# Patient Record
Sex: FEMALE | Race: ASIAN | NOT HISPANIC OR LATINO | URBAN - METROPOLITAN AREA
[De-identification: names, ages, dates, MRNs, and addresses within clinical notes are randomized per-mention and may not be internally consistent; named-entity substitution may affect disease eponyms.]

---

## 2020-07-14 ENCOUNTER — INPATIENT (INPATIENT)
Facility: HOSPITAL | Age: 85
LOS: 2 days | Discharge: HOME | End: 2020-07-17
Attending: HOSPITALIST | Admitting: HOSPITALIST
Payer: MEDICAID

## 2020-07-14 VITALS
TEMPERATURE: 98 F | SYSTOLIC BLOOD PRESSURE: 113 MMHG | DIASTOLIC BLOOD PRESSURE: 64 MMHG | RESPIRATION RATE: 16 BRPM | HEART RATE: 69 BPM | OXYGEN SATURATION: 98 %

## 2020-07-14 DIAGNOSIS — R62.7 ADULT FAILURE TO THRIVE: ICD-10-CM

## 2020-07-14 DIAGNOSIS — R60.0 LOCALIZED EDEMA: ICD-10-CM

## 2020-07-14 DIAGNOSIS — R13.10 DYSPHAGIA, UNSPECIFIED: ICD-10-CM

## 2020-07-14 DIAGNOSIS — R16.0 HEPATOMEGALY, NOT ELSEWHERE CLASSIFIED: ICD-10-CM

## 2020-07-14 DIAGNOSIS — Z29.9 ENCOUNTER FOR PROPHYLACTIC MEASURES, UNSPECIFIED: ICD-10-CM

## 2020-07-14 DIAGNOSIS — R07.89 OTHER CHEST PAIN: ICD-10-CM

## 2020-07-14 LAB
ALBUMIN SERPL ELPH-MCNC: 3.1 G/DL — LOW (ref 3.3–5)
ALP SERPL-CCNC: 323 U/L — HIGH (ref 40–120)
ALT FLD-CCNC: 68 U/L — HIGH (ref 4–33)
AMMONIA BLD-MCNC: 43 UMOL/L — SIGNIFICANT CHANGE UP (ref 11–55)
ANION GAP SERPL CALC-SCNC: 11 MMO/L — SIGNIFICANT CHANGE UP (ref 7–14)
APPEARANCE UR: CLEAR — SIGNIFICANT CHANGE UP
AST SERPL-CCNC: 83 U/L — HIGH (ref 4–32)
BASOPHILS # BLD AUTO: 0.02 K/UL — SIGNIFICANT CHANGE UP (ref 0–0.2)
BASOPHILS NFR BLD AUTO: 0.5 % — SIGNIFICANT CHANGE UP (ref 0–2)
BILIRUB SERPL-MCNC: 0.6 MG/DL — SIGNIFICANT CHANGE UP (ref 0.2–1.2)
BILIRUB UR-MCNC: NEGATIVE — SIGNIFICANT CHANGE UP
BLOOD UR QL VISUAL: NEGATIVE — SIGNIFICANT CHANGE UP
BUN SERPL-MCNC: 19 MG/DL — SIGNIFICANT CHANGE UP (ref 7–23)
CALCIUM SERPL-MCNC: 8.2 MG/DL — LOW (ref 8.4–10.5)
CHLORIDE SERPL-SCNC: 104 MMOL/L — SIGNIFICANT CHANGE UP (ref 98–107)
CO2 SERPL-SCNC: 22 MMOL/L — SIGNIFICANT CHANGE UP (ref 22–31)
COLOR SPEC: SIGNIFICANT CHANGE UP
CREAT SERPL-MCNC: 0.77 MG/DL — SIGNIFICANT CHANGE UP (ref 0.5–1.3)
EOSINOPHIL # BLD AUTO: 0.03 K/UL — SIGNIFICANT CHANGE UP (ref 0–0.5)
EOSINOPHIL NFR BLD AUTO: 0.7 % — SIGNIFICANT CHANGE UP (ref 0–6)
GLUCOSE SERPL-MCNC: 84 MG/DL — SIGNIFICANT CHANGE UP (ref 70–99)
GLUCOSE UR-MCNC: NEGATIVE — SIGNIFICANT CHANGE UP
HCT VFR BLD CALC: 34.6 % — SIGNIFICANT CHANGE UP (ref 34.5–45)
HGB BLD-MCNC: 11 G/DL — LOW (ref 11.5–15.5)
IMM GRANULOCYTES NFR BLD AUTO: 0.7 % — SIGNIFICANT CHANGE UP (ref 0–1.5)
KETONES UR-MCNC: SIGNIFICANT CHANGE UP
LEUKOCYTE ESTERASE UR-ACNC: NEGATIVE — SIGNIFICANT CHANGE UP
LIDOCAIN IGE QN: 56.8 U/L — SIGNIFICANT CHANGE UP (ref 7–60)
LYMPHOCYTES # BLD AUTO: 0.98 K/UL — LOW (ref 1–3.3)
LYMPHOCYTES # BLD AUTO: 23.5 % — SIGNIFICANT CHANGE UP (ref 13–44)
MAGNESIUM SERPL-MCNC: 2.1 MG/DL — SIGNIFICANT CHANGE UP (ref 1.6–2.6)
MCHC RBC-ENTMCNC: 29.6 PG — SIGNIFICANT CHANGE UP (ref 27–34)
MCHC RBC-ENTMCNC: 31.8 % — LOW (ref 32–36)
MCV RBC AUTO: 93 FL — SIGNIFICANT CHANGE UP (ref 80–100)
MONOCYTES # BLD AUTO: 0.3 K/UL — SIGNIFICANT CHANGE UP (ref 0–0.9)
MONOCYTES NFR BLD AUTO: 7.2 % — SIGNIFICANT CHANGE UP (ref 2–14)
NEUTROPHILS # BLD AUTO: 2.81 K/UL — SIGNIFICANT CHANGE UP (ref 1.8–7.4)
NEUTROPHILS NFR BLD AUTO: 67.4 % — SIGNIFICANT CHANGE UP (ref 43–77)
NITRITE UR-MCNC: NEGATIVE — SIGNIFICANT CHANGE UP
NRBC # FLD: 0 K/UL — SIGNIFICANT CHANGE UP (ref 0–0)
NT-PROBNP SERPL-SCNC: 434.9 PG/ML — SIGNIFICANT CHANGE UP
PH UR: 7 — SIGNIFICANT CHANGE UP (ref 5–8)
PHOSPHATE SERPL-MCNC: 2.9 MG/DL — SIGNIFICANT CHANGE UP (ref 2.5–4.5)
PLATELET # BLD AUTO: 226 K/UL — SIGNIFICANT CHANGE UP (ref 150–400)
PMV BLD: 9.6 FL — SIGNIFICANT CHANGE UP (ref 7–13)
POTASSIUM SERPL-MCNC: 3.9 MMOL/L — SIGNIFICANT CHANGE UP (ref 3.5–5.3)
POTASSIUM SERPL-SCNC: 3.9 MMOL/L — SIGNIFICANT CHANGE UP (ref 3.5–5.3)
PROT SERPL-MCNC: 5.6 G/DL — LOW (ref 6–8.3)
PROT UR-MCNC: NEGATIVE — SIGNIFICANT CHANGE UP
RBC # BLD: 3.72 M/UL — LOW (ref 3.8–5.2)
RBC # FLD: 17.7 % — HIGH (ref 10.3–14.5)
SODIUM SERPL-SCNC: 137 MMOL/L — SIGNIFICANT CHANGE UP (ref 135–145)
SP GR SPEC: 1.03 — SIGNIFICANT CHANGE UP (ref 1–1.04)
TROPONIN T, HIGH SENSITIVITY: 24 NG/L — SIGNIFICANT CHANGE UP (ref ?–14)
UROBILINOGEN FLD QL: NORMAL — SIGNIFICANT CHANGE UP
WBC # BLD: 4.17 K/UL — SIGNIFICANT CHANGE UP (ref 3.8–10.5)
WBC # FLD AUTO: 4.17 K/UL — SIGNIFICANT CHANGE UP (ref 3.8–10.5)

## 2020-07-14 PROCEDURE — 74177 CT ABD & PELVIS W/CONTRAST: CPT | Mod: 26

## 2020-07-14 PROCEDURE — 99223 1ST HOSP IP/OBS HIGH 75: CPT

## 2020-07-14 RX ORDER — ENOXAPARIN SODIUM 100 MG/ML
40 INJECTION SUBCUTANEOUS DAILY
Refills: 0 | Status: DISCONTINUED | OUTPATIENT
Start: 2020-07-14 | End: 2020-07-17

## 2020-07-14 RX ORDER — FAMOTIDINE 10 MG/ML
20 INJECTION INTRAVENOUS DAILY
Refills: 0 | Status: DISCONTINUED | OUTPATIENT
Start: 2020-07-14 | End: 2020-07-17

## 2020-07-14 RX ORDER — SIMETHICONE 80 MG/1
80 TABLET, CHEWABLE ORAL
Refills: 0 | Status: DISCONTINUED | OUTPATIENT
Start: 2020-07-14 | End: 2020-07-17

## 2020-07-14 RX ORDER — ACETAMINOPHEN 500 MG
650 TABLET ORAL EVERY 8 HOURS
Refills: 0 | Status: DISCONTINUED | OUTPATIENT
Start: 2020-07-14 | End: 2020-07-17

## 2020-07-14 RX ORDER — ONDANSETRON 8 MG/1
4 TABLET, FILM COATED ORAL ONCE
Refills: 0 | Status: COMPLETED | OUTPATIENT
Start: 2020-07-14 | End: 2020-07-14

## 2020-07-14 RX ORDER — ONDANSETRON 8 MG/1
4 TABLET, FILM COATED ORAL EVERY 6 HOURS
Refills: 0 | Status: DISCONTINUED | OUTPATIENT
Start: 2020-07-14 | End: 2020-07-17

## 2020-07-14 RX ORDER — SODIUM CHLORIDE 9 MG/ML
500 INJECTION, SOLUTION INTRAVENOUS ONCE
Refills: 0 | Status: COMPLETED | OUTPATIENT
Start: 2020-07-14 | End: 2020-07-14

## 2020-07-14 RX ADMIN — Medication 650 MILLIGRAM(S): at 23:35

## 2020-07-14 RX ADMIN — SODIUM CHLORIDE 500 MILLILITER(S): 9 INJECTION, SOLUTION INTRAVENOUS at 12:42

## 2020-07-14 RX ADMIN — SODIUM CHLORIDE 500 MILLILITER(S): 9 INJECTION, SOLUTION INTRAVENOUS at 11:42

## 2020-07-14 NOTE — H&P ADULT - PROBLEM SELECTOR PLAN 5
-mild edema of the ankles. No calf tenderness or erythema. Low suspicion for DVT  -suspect likely related to mass effect and portal vein compression 2/2 to liver mass

## 2020-07-14 NOTE — ED ADULT NURSE REASSESSMENT NOTE - NS ED NURSE REASSESS COMMENT FT1
patient assisted with bedpan unable to urinate at this time. abdomen soft nondistended " I don't think I have to go now" patient assisted with , Jassi, explained urinary straight cath procedure. patient refuses at this time. MD Harris aware. no new orders. safety maintained

## 2020-07-14 NOTE — H&P ADULT - HISTORY OF PRESENT ILLNESS
89 F w/ history of TB (s/p treatment) p/w inability to tolerate PO and abdominal bloating. Patient reports in the past 2 weeks, she is unable to tolerate any solid food. She reports she is able to swallow food without issues but will vomiting after eating solid food. She is only able to keep down liquids. Reports she is unable to keep solid food down because she feels her stomach is bloated. Denies coughing after food intake. Reports right sided pressure-like abdominal pain. Last BM yesterday. Denies fevers, chills, SOB at home. Reports some bilateral lower extremity swelling, and generalized weakness. Also reports chest pain which she states has been ongoing since diagnosis of TB and states it feels like "nerve pain". Denies pressure like pain, pain not associated with activity/exertion. Family reports in the past 2 days she has remained mostly in bed due to significant weakness. As outpatient, she had an ultrasound of the liver - discovered 14 cm tumor last Friday.

## 2020-07-14 NOTE — ED PROVIDER NOTE - ATTENDING CONTRIBUTION TO CARE
Dr. Barrett: 89 yo female with recent diagnosis of liver mass (unclear pathology), sent to ED by family due to 1 week of decreased PO intake and nonbloody emesis.  Has also been having diarrhea and right-sided abdominal pain.  On exam pt chronically-ill appearing but in NAD, heart RRR, lungs CTAB, abd NTND but with large palpable liver, extremities without swelling, strength 5/5 in all extremities and skin without rash or jaundice.

## 2020-07-14 NOTE — H&P ADULT - NSHPLABSRESULTS_GEN_ALL_CORE
( @ 11:32)                      11.0  4.17 )-----------( 226                 34.6    Neutrophils = 2.81 (67.4%)  Lymphocytes = 0.98 (23.5%)  Eosinophils = 0.03 (0.7%)  Basophils = 0.02 (0.5%)  Monocytes = 0.30 (7.2%)  Bands = --%        137  |  104  |  19  ----------------------------<  84  3.9   |  22  |  0.77    Ca    8.2<L>      2020 11:32  Phos  2.9       Mg     2.1         TPro  5.6<L>  /  Alb  3.1<L>  /  TBili  0.6  /  DBili  x   /  AST  83<H>  /  ALT  68<H>  /  AlkPhos  323<H>          Urinalysis Basic - ( 2020 11:03 )    Color: LIGHT YELLOW / Appearance: CLEAR / S.026 / pH: 7.0  Gluc: NEGATIVE / Ketone: TRACE  / Bili: NEGATIVE / Urobili: NORMAL   Blood: NEGATIVE / Protein: NEGATIVE / Nitrite: NEGATIVE   Leuk Esterase: NEGATIVE / RBC: x / WBC x   Sq Epi: x / Non Sq Epi: x / Bacteria: x    < from: CT Abdomen and Pelvis w/ IV Cont (20 @ 14:44) >    IMPRESSION:   There is an expansile hypervascular hepatic mass. No cirrhosis. Findings are suspicious for HCC.    No portal venous thrombosis.    Multiple pulmonary nodules concerning for metastatic disease.    < end of copied text >        Labs and imaging reviewed  EKG: sinus shelby rate 59. RBBB. No ST segment elevations

## 2020-07-14 NOTE — H&P ADULT - ASSESSMENT
89 F w/ history of TB (s/p treatment) p/w inability to tolerate PO and abdominal bloating in the setting of large hepatic liver mass

## 2020-07-14 NOTE — ED PROVIDER NOTE - OBJECTIVE STATEMENT
Alex BAPTISTE MD PGY3: 89 F hx per daughter in law Li - cannot eat, recurrent emesis of all PO intake x 1 week, only liquid PO, emesis x 2 - 3 days, NBNB assoc with R sided upper pressure-like abdominal pain and feels abdomen is bloated, some diarrhea last BM yesterday, no fevers at home.  Bilateral lower extremity swelling, and increased lethargy; difficult to awaken patient at home. Did an ultrasound of the liver - discovered tumor 14cm newly discovered last Friday.    PMD: Dr Gerardo Thrasher in [place]   PMH; no other medical problems, does not take any medications at home.

## 2020-07-14 NOTE — H&P ADULT - PROBLEM SELECTOR PLAN 1
-Pt with poor PO intake. Nausea/vomiting with solid food intake. Only able to tolerate liquids  -likely 2/2 to large liver mass with mass effect  -liquid diet pending further eval

## 2020-07-14 NOTE — ED ADULT TRIAGE NOTE - CHIEF COMPLAINT QUOTE
Mandarin speaking with her daughter in law translating. Patient c/o right sided abdominal pain, diarrhea, nausea, vomiting, and bilateral ankle swelling.   [phone]   daughter in law phone #.  Patient has a h/o elevated LFT as per her daughter .

## 2020-07-14 NOTE — H&P ADULT - NSHPPHYSICALEXAM_GEN_ALL_CORE
GENERAL APPEARANCE: Well developed, well nourished, alert and cooperative. NAD.   HEENT:  PERRL, EOMI. External auditory canals normal, hearing grossly intact.  NECK: Neck supple, non-tender without lymphadenopathy, masses or thyromegaly.  CARDIAC: Normal S1 and S2. No S3, S4 or murmurs. Rhythm is regular.  LUNGS: Clear to auscultation and percussion without rales, rhonchi, wheezing or diminished breath sounds.  ABDOMEN: Positive bowel sounds. Soft, distended, RUQ tenderness on exam. No guarding or rebound.   MUSCULOSKELETAL: ROM intact spine and extremities. No joint erythema or tenderness.   BACK: normal posture, no spinal deformity, symmetry of spinal muscles  EXTREMITIES: No significant deformity or joint abnormality. +1 pedal edema. Peripheral pulses intact. No varicosities.  NEUROLOGICAL: CN II-XII intact. Strength and sensation symmetric and intact throughout.   SKIN: Skin normal color, texture and turgor with no lesions or eruptions.  PSYCHIATRIC: AOx3.Normal affect and behavior.

## 2020-07-14 NOTE — H&P ADULT - NSHPREVIEWOFSYSTEMS_GEN_ALL_CORE
General: +weakness No weight loss, fever, chills  HEENT:  Eyes:  No visual loss, blurred vision, double vision or yellow sclerae. Ears, Nose, Throat:  No hearing loss, sneezing, congestion, runny nose or sore throat.  SKIN:  No rash or itching.  CARDIOVASCULAR:  +intermittent chest pain +LE edema No chest pressure No palpitations   RESPIRATORY:  No shortness of breath, cough or sputum.  GASTROINTESTINAL:  +anorexia +nausea/vomiting +abdominal pain No blood.  GENITOURINARY:  Denies hematuria, dysuria.   NEUROLOGICAL:  No headache, dizziness, syncope, paralysis, ataxia, numbness or tingling in the extremities. No change in bowel or bladder control.  MUSCULOSKELETAL:  No muscle, back pain, joint pain or stiffness.  HEMATOLOGIC:  No anemia, bleeding or bruising.  LYMPHATICS:  No enlarged nodes. No history of splenectomy.  PSYCHIATRIC:  No history of depression or anxiety.  ENDOCRINOLOGIC:  No reports of sweating, cold or heat intolerance. No polyuria or polydipsia.  ALLERGIES:  No history of asthma, hives, eczema or rhinitis.

## 2020-07-14 NOTE — ED PROVIDER NOTE - CLINICAL SUMMARY MEDICAL DECISION MAKING FREE TEXT BOX
Alex BAPTISTE MD PGY3: 89 F with known liver mass recently diagnosed here for nausea/ vomiting and inability to tolerate PO x 1 week with recurrent emesis c/f SBO,  gastric outlet obstruction from mass. WIll obtain CTAP to assess. Likely admit for inability to tolerate PO.

## 2020-07-14 NOTE — ED ADULT NURSE REASSESSMENT NOTE - NS ED NURSE REASSESS COMMENT FT1
report given ot Mark RN in [place] for continuity of patient care. patient pending transport via stretcher safety maintained.

## 2020-07-14 NOTE — ED ADULT NURSE REASSESSMENT NOTE - NS ED NURSE REASSESS COMMENT FT1
patient updated on plan of care  number [id number], Mandarin. patient 1 person assisted onto bed pan. denies discomfort denies nausea stomach pain. patient resting comfortably. warm blanket applied on legs for comfort. will continue to monitor

## 2020-07-14 NOTE — H&P ADULT - PROBLEM SELECTOR PLAN 3
-Pt reports solid food feeling "stuck" or unable to keep down due to bloating  -Obtain esophagram to r/o upper GI obstruction however likely just mass effect from large liver mass  -liquid diet for now

## 2020-07-14 NOTE — ED ADULT NURSE NOTE - CHIEF COMPLAINT QUOTE
Mandarin speaking with her daughter in law translating. Patient c/o right sided abdominal pain, diarrhea, nausea, vomiting, and bilateral ankle swelling.   [phone]   daughter in law phone #.  Patient has a h/o elevated LFT as per her daughter .

## 2020-07-14 NOTE — H&P ADULT - NSHPSOCIALHISTORY_GEN_ALL_CORE
Denies tobacco, alcohol or illicit drug use  Family reports patient was independent prior to these past few days

## 2020-07-14 NOTE — ED PROVIDER NOTE - PHYSICAL EXAMINATION
Alex BAPTISTE MD PGY3:   PHYSICAL EXAM:    GENERAL: NAD, well-developed  HEENT:  Atraumatic, Normocephalic  CHEST/LUNG: Chest rise equal bilaterally  HEART: Regular rate and rhythm  ABDOMEN: R sided abdominal TTP. R liver edge palpable.   EXTREMITIES:  2+ Peripheral Pulses.  PSYCH: A&Ox3  SKIN: No obvious rashes or lesions

## 2020-07-15 LAB
ALBUMIN SERPL ELPH-MCNC: 2.9 G/DL — LOW (ref 3.3–5)
ALP SERPL-CCNC: 310 U/L — HIGH (ref 40–120)
ALT FLD-CCNC: 59 U/L — HIGH (ref 4–33)
ANION GAP SERPL CALC-SCNC: 15 MMO/L — HIGH (ref 7–14)
AST SERPL-CCNC: 79 U/L — HIGH (ref 4–32)
BILIRUB SERPL-MCNC: 0.6 MG/DL — SIGNIFICANT CHANGE UP (ref 0.2–1.2)
BUN SERPL-MCNC: 21 MG/DL — SIGNIFICANT CHANGE UP (ref 7–23)
CALCIUM SERPL-MCNC: 8 MG/DL — LOW (ref 8.4–10.5)
CHLORIDE SERPL-SCNC: 102 MMOL/L — SIGNIFICANT CHANGE UP (ref 98–107)
CK MB BLD-MCNC: 1.18 NG/ML — SIGNIFICANT CHANGE UP (ref 1–4.7)
CK SERPL-CCNC: 38 U/L — SIGNIFICANT CHANGE UP (ref 25–170)
CO2 SERPL-SCNC: 19 MMOL/L — LOW (ref 22–31)
CREAT SERPL-MCNC: 0.7 MG/DL — SIGNIFICANT CHANGE UP (ref 0.5–1.3)
GLUCOSE SERPL-MCNC: 55 MG/DL — LOW (ref 70–99)
HCT VFR BLD CALC: 35.6 % — SIGNIFICANT CHANGE UP (ref 34.5–45)
HGB BLD-MCNC: 11.3 G/DL — LOW (ref 11.5–15.5)
MAGNESIUM SERPL-MCNC: 2.2 MG/DL — SIGNIFICANT CHANGE UP (ref 1.6–2.6)
MCHC RBC-ENTMCNC: 29.5 PG — SIGNIFICANT CHANGE UP (ref 27–34)
MCHC RBC-ENTMCNC: 31.7 % — LOW (ref 32–36)
MCV RBC AUTO: 93 FL — SIGNIFICANT CHANGE UP (ref 80–100)
NRBC # FLD: 0 K/UL — SIGNIFICANT CHANGE UP (ref 0–0)
PHOSPHATE SERPL-MCNC: 3.7 MG/DL — SIGNIFICANT CHANGE UP (ref 2.5–4.5)
PLATELET # BLD AUTO: 224 K/UL — SIGNIFICANT CHANGE UP (ref 150–400)
PMV BLD: 9.5 FL — SIGNIFICANT CHANGE UP (ref 7–13)
POTASSIUM SERPL-MCNC: 3.9 MMOL/L — SIGNIFICANT CHANGE UP (ref 3.5–5.3)
POTASSIUM SERPL-SCNC: 3.9 MMOL/L — SIGNIFICANT CHANGE UP (ref 3.5–5.3)
PROT SERPL-MCNC: 5.3 G/DL — LOW (ref 6–8.3)
RBC # BLD: 3.83 M/UL — SIGNIFICANT CHANGE UP (ref 3.8–5.2)
RBC # FLD: 18 % — HIGH (ref 10.3–14.5)
SARS-COV-2 RNA SPEC QL NAA+PROBE: SIGNIFICANT CHANGE UP
SODIUM SERPL-SCNC: 136 MMOL/L — SIGNIFICANT CHANGE UP (ref 135–145)
T3 SERPL-MCNC: 51.2 NG/DL — LOW (ref 80–200)
T4 AB SER-ACNC: 6.49 UG/DL — SIGNIFICANT CHANGE UP (ref 5.1–13)
TROPONIN T, HIGH SENSITIVITY: 24 NG/L — SIGNIFICANT CHANGE UP (ref ?–14)
TSH SERPL-MCNC: 4.51 UIU/ML — HIGH (ref 0.27–4.2)
WBC # BLD: 4.43 K/UL — SIGNIFICANT CHANGE UP (ref 3.8–10.5)
WBC # FLD AUTO: 4.43 K/UL — SIGNIFICANT CHANGE UP (ref 3.8–10.5)

## 2020-07-15 PROCEDURE — 99233 SBSQ HOSP IP/OBS HIGH 50: CPT

## 2020-07-15 PROCEDURE — 99255 IP/OBS CONSLTJ NEW/EST HI 80: CPT | Mod: GC

## 2020-07-15 PROCEDURE — 71045 X-RAY EXAM CHEST 1 VIEW: CPT | Mod: 26

## 2020-07-15 RX ADMIN — ENOXAPARIN SODIUM 40 MILLIGRAM(S): 100 INJECTION SUBCUTANEOUS at 12:17

## 2020-07-15 RX ADMIN — FAMOTIDINE 20 MILLIGRAM(S): 10 INJECTION INTRAVENOUS at 12:17

## 2020-07-15 NOTE — CONSULT NOTE ADULT - ATTENDING COMMENTS
Pt seen examined and d/w fellow. Agree with a/p as above. History obtained through [facility]  for Mandarin. The above was confirmed at the bedside. PE remarkable for an elderly Chinese female appearing younger than her stated age. ABd mildly obese w/o HSM/ masses. mild B/ ankle edema. A/P Pt has radiologic findings c/w HCC and likely pulm mets. She has expressed a desire not to get active therapy but noted she is open to some discussion re options. We revd the finding and the incurable nature of the disease and the option of active ant-cancer therapy with sorafinib (brief discussion re side effects and will provide with written info in Mandarin) vs best supportive care / hospice in light of the relatively low rate of response, extent of disease and her age. Pt wishes to consider and will f/u .

## 2020-07-15 NOTE — CONSULT NOTE ADULT - SUBJECTIVE AND OBJECTIVE BOX
Oncology Consult Note    Mandarin  #: [id number]    HPI:  89yo F w/ hx treated pulmonary TB presents with decreased appetite, vomiting, and abdominal bloating. States for the last 10d she has had poor appetite and vomited with solid foods. Has abd bloating with associated RUQ/R flank pain. Also with reported 20kg unintentional weight loss over the last year. Denies hx of etoh or tobacco use. At baseline is very functional (ie able to cook for herself, walk around 100-200meters with her cane). She had an outpatient ultrasound of liver which showed 14 cm tumor. CT A/P with expansile hypervascular hepatic mass concerning for HCC as well as multiple pulmonary nodules concerning for metastatic disease.     PAST MEDICAL & SURGICAL HISTORY:  Tuberculosis, treated  No significant past surgical history      FAMILY HISTORY:  No pertinent family history in first degree relatives      MEDICATIONS  (STANDING):  enoxaparin Injectable 40 milliGRAM(s) SubCutaneous daily  famotidine    Tablet 20 milliGRAM(s) Oral daily    MEDICATIONS  (PRN):  acetaminophen   Tablet .. 650 milliGRAM(s) Oral every 8 hours PRN Mild Pain (1 - 3), Moderate Pain (4 - 6)  ondansetron Injectable 4 milliGRAM(s) IV Push every 6 hours PRN Nausea and/or Vomiting  simethicone 80 milliGRAM(s) Chew four times a day PRN Gas      Allergies    No Known Allergies    Intolerances        SOCIAL HISTORY: No EtOH, no tobacco    REVIEW OF SYSTEMS:    CONSTITUTIONAL: No weakness, fevers or chills; + weight loss  EYES/ENT: No visual changes;  No vertigo or throat pain   NECK: No pain or stiffness  RESPIRATORY: No cough, wheezing, hemoptysis; No shortness of breath  CARDIOVASCULAR: No chest pain or palpitations  GASTROINTESTINAL: + RUQ/R flank pain. + vomiting; No diarrhea or constipation. No melena or hematochezia.  GENITOURINARY: No dysuria, frequency or hematuria  NEUROLOGICAL: No numbness or weakness  SKIN: No itching, burning, rashes, or lesions   All other review of systems is negative unless indicated above.    Height (cm): 160 (07-15 @ 02:13)  Weight (kg): 48.2 (07-15 @ 02:13)  BMI (kg/m2): 18.8 (07-15 @ 02:13)  BSA (m2): 1.48 (07-15 @ 02:13)    T(F): 98.4 (07-15-20 @ 02:13), Max: 98.4 (07-15-20 @ 02:13)  HR: 64 (07-15-20 @ 02:13)  BP: 121/58 (07-15-20 @ 02:13)  RR: 19 (07-15-20 @ 02:13)  SpO2: 97% (07-15-20 @ 02:13)  Wt(kg): --    GENERAL: NAD, well-developed  HEAD:  Atraumatic, Normocephalic  EYES: EOMI, PERRLA, conjunctiva and sclera clear  NECK: Supple, No JVD  CHEST/LUNG: Clear to auscultation bilaterally; No wheeze  HEART: Regular rate and rhythm; No murmurs, rubs, or gallops  ABDOMEN: mild ttp RUQ/R flank area; Bowel sounds present  EXTREMITIES:  2+ Peripheral Pulses, No clubbing, cyanosis, or edema  NEUROLOGY: non-focal  SKIN: No rashes or lesions                          11.3   4.43  )-----------( 224      ( 15 Jul 2020 04:00 )             35.6       07-15    136  |  102  |  21  ----------------------------<  55<L>  3.9   |  19<L>  |  0.70    Ca    8.0<L>      15 Jul 2020 04:00  Phos  3.7     07-15  Mg     2.2     07-15    TPro  5.3<L>  /  Alb  2.9<L>  /  TBili  0.6  /  DBili  x   /  AST  79<H>  /  ALT  59<H>  /  AlkPhos  310<H>  07-15      Phosphorus Level, Serum: 3.7 mg/dL (07-15 @ 04:00)  Magnesium, Serum: 2.2 mg/dL (07-15 @ 04:00)    CT A/P  FINDINGS:   LOWER CHEST: Small right and trace left pleural effusions. Multiple   pulmonary nodules.     LIVER: An expansile hypervascular heterogeneously enhancing hepatic mass   with central necrosis measuring 16.0 x 11.5 x 15.9 cm. There is mass effect   on adjacent portal veins and hepatic arteries. No portal venous thrombosis.   Normal contour of the liver.   BILE DUCTS: Normal caliber.   GALLBLADDER: Within normal limits.   SPLEEN: Within normal limits.   PANCREAS: Within normal limits.   ADRENALS: Within normal limits.   KIDNEYS/URETERS: No renal stones or hydronephrosis.     BLADDER: Within normal limits.   REPRODUCTIVE ORGANS: Uterus and adnexa within normal limits.     BOWEL: No bowel obstruction. Duodenal diverticulum. Appendicolith without   appendicitis..   PERITONEUM: Small ascites.   VESSELS: Atherosclerotic changes.   RETROPERITONEUM/LYMPH NODES: No lymphadenopathy.   ABDOMINAL WALL: Within normal limits.   BONES: Degenerative changes.     IMPRESSION:   There is an expansile hypervascular hepatic mass. No cirrhosis. Findings are   suspicious for HCC.     No portal venous thrombosis.     Multiple pulmonary nodules concerning for metastatic disease.

## 2020-07-15 NOTE — CONSULT NOTE ADULT - ASSESSMENT
89yo F w/ hx treated pulmonary TB presents with decreased appetite, vomiting, and abdominal bloating. Found to have large hepatic mass concerning for HCC. Oncology consulted for evaluation of possible HCC.     Hepatic mass  - CT A/P with large hepatic mass with pulm nodules concerning for mets  - LFTs with Alk Phos 310; T bili/ALT/AST wnl; albumin 2.9   - appears to be Child-Keita Class A  - spoke with radiology resident, strong suspicion for HCC without additional dedicated liver imaging    - please send AFP, Hep B panel, Hep C Ab w/ reflex to RNA, PT/PTT  - recommend CT chest to help in staging   -     Case discussed with ..  Please see attending addendum.     Maicol Bassett MD  Heme/Onc Fellow 89yo F w/ hx treated pulmonary TB presents with decreased appetite, vomiting, and abdominal bloating. Found to have large hepatic mass concerning for HCC. Oncology consulted for evaluation of possible HCC.     Hepatic mass  - CT A/P with large hepatic mass with pulm nodules concerning for mets  - LFTs with Alk Phos 310; T bili/ALT/AST wnl; albumin 2.9   - appears to be Child-Keita Class A, ECOG 0-1  - spoke with radiology resident, strong suspicion for HCC without additional dedicated liver imaging    - please send AFP, Hep B panel, Hep C Ab w/ reflex to RNA, PT/PTT  - recommend CT chest to help in staging   -     Case discussed with ..  Please see attending addendum.     Maicol Bassett MD  Heme/Onc Fellow 89yo F w/ hx treated pulmonary TB presents with decreased appetite, vomiting, and abdominal bloating. Found to have large hepatic mass concerning for HCC. Oncology consulted for evaluation of possible HCC.     Hepatic mass  - CT A/P with large hepatic mass with pulm nodules concerning for mets  - LFTs with Alk Phos 310; T bili/ALT/AST wnl; albumin 2.9   - functional status ECOG 0-1  - spoke with radiology resident, strong suspicion for HCC without additional dedicated liver imaging  - please send AFP, Hep B panel, Hep C Ab w/ reflex to RNA, PT/PTT  - recommend CT chest to help in staging   - options for outpatient treatment include sorafenib. Will provide patient with information about this medication and she will make a decision with her family about whether she would like to pursue treatment  - management of dysphagia per primary team    Case discussed with Dr. Lennon. Please see attending addendum.     Maicol Bassett MD  Heme/Onc Fellow

## 2020-07-16 DIAGNOSIS — Z02.9 ENCOUNTER FOR ADMINISTRATIVE EXAMINATIONS, UNSPECIFIED: ICD-10-CM

## 2020-07-16 DIAGNOSIS — E03.9 HYPOTHYROIDISM, UNSPECIFIED: ICD-10-CM

## 2020-07-16 LAB
AFP-TM SERPL-MCNC: 3588 NG/ML — HIGH
ANION GAP SERPL CALC-SCNC: 9 MMO/L — SIGNIFICANT CHANGE UP (ref 7–14)
APTT BLD: 29.6 SEC — SIGNIFICANT CHANGE UP (ref 27–36.3)
BASOPHILS # BLD AUTO: 0.02 K/UL — SIGNIFICANT CHANGE UP (ref 0–0.2)
BASOPHILS NFR BLD AUTO: 0.5 % — SIGNIFICANT CHANGE UP (ref 0–2)
BUN SERPL-MCNC: 25 MG/DL — HIGH (ref 7–23)
CALCIUM SERPL-MCNC: 8.2 MG/DL — LOW (ref 8.4–10.5)
CHLORIDE SERPL-SCNC: 101 MMOL/L — SIGNIFICANT CHANGE UP (ref 98–107)
CO2 SERPL-SCNC: 23 MMOL/L — SIGNIFICANT CHANGE UP (ref 22–31)
CREAT SERPL-MCNC: 0.9 MG/DL — SIGNIFICANT CHANGE UP (ref 0.5–1.3)
EOSINOPHIL # BLD AUTO: 0.08 K/UL — SIGNIFICANT CHANGE UP (ref 0–0.5)
EOSINOPHIL NFR BLD AUTO: 1.8 % — SIGNIFICANT CHANGE UP (ref 0–6)
GLUCOSE SERPL-MCNC: 92 MG/DL — SIGNIFICANT CHANGE UP (ref 70–99)
HBV CORE AB SER-ACNC: REACTIVE — HIGH
HBV SURFACE AB SER-ACNC: NONREACTIVE — SIGNIFICANT CHANGE UP
HBV SURFACE AG SER-ACNC: NEGATIVE — SIGNIFICANT CHANGE UP
HCT VFR BLD CALC: 35 % — SIGNIFICANT CHANGE UP (ref 34.5–45)
HCV AB S/CO SERPL IA: 0.1 S/CO — SIGNIFICANT CHANGE UP (ref 0–0.99)
HCV AB SERPL-IMP: SIGNIFICANT CHANGE UP
HGB BLD-MCNC: 11.6 G/DL — SIGNIFICANT CHANGE UP (ref 11.5–15.5)
IMM GRANULOCYTES NFR BLD AUTO: 0.7 % — SIGNIFICANT CHANGE UP (ref 0–1.5)
INR BLD: 1.13 — SIGNIFICANT CHANGE UP (ref 0.88–1.17)
LYMPHOCYTES # BLD AUTO: 1.15 K/UL — SIGNIFICANT CHANGE UP (ref 1–3.3)
LYMPHOCYTES # BLD AUTO: 26.4 % — SIGNIFICANT CHANGE UP (ref 13–44)
MAGNESIUM SERPL-MCNC: 2.2 MG/DL — SIGNIFICANT CHANGE UP (ref 1.6–2.6)
MCHC RBC-ENTMCNC: 30.9 PG — SIGNIFICANT CHANGE UP (ref 27–34)
MCHC RBC-ENTMCNC: 33.1 % — SIGNIFICANT CHANGE UP (ref 32–36)
MCV RBC AUTO: 93.1 FL — SIGNIFICANT CHANGE UP (ref 80–100)
MONOCYTES # BLD AUTO: 0.31 K/UL — SIGNIFICANT CHANGE UP (ref 0–0.9)
MONOCYTES NFR BLD AUTO: 7.1 % — SIGNIFICANT CHANGE UP (ref 2–14)
NEUTROPHILS # BLD AUTO: 2.76 K/UL — SIGNIFICANT CHANGE UP (ref 1.8–7.4)
NEUTROPHILS NFR BLD AUTO: 63.5 % — SIGNIFICANT CHANGE UP (ref 43–77)
NRBC # FLD: 0 K/UL — SIGNIFICANT CHANGE UP (ref 0–0)
PHOSPHATE SERPL-MCNC: 3.3 MG/DL — SIGNIFICANT CHANGE UP (ref 2.5–4.5)
PLATELET # BLD AUTO: 234 K/UL — SIGNIFICANT CHANGE UP (ref 150–400)
PMV BLD: 10.1 FL — SIGNIFICANT CHANGE UP (ref 7–13)
POTASSIUM SERPL-MCNC: 4.2 MMOL/L — SIGNIFICANT CHANGE UP (ref 3.5–5.3)
POTASSIUM SERPL-SCNC: 4.2 MMOL/L — SIGNIFICANT CHANGE UP (ref 3.5–5.3)
PROTHROM AB SERPL-ACNC: 12.9 SEC — SIGNIFICANT CHANGE UP (ref 9.8–13.1)
RBC # BLD: 3.76 M/UL — LOW (ref 3.8–5.2)
RBC # FLD: 18.4 % — HIGH (ref 10.3–14.5)
SARS-COV-2 IGG SERPL QL IA: NEGATIVE — SIGNIFICANT CHANGE UP
SARS-COV-2 IGM SERPL IA-ACNC: <3.8 AU/ML — SIGNIFICANT CHANGE UP
SODIUM SERPL-SCNC: 133 MMOL/L — LOW (ref 135–145)
T4 AB SER-ACNC: 6.31 UG/DL — SIGNIFICANT CHANGE UP (ref 5.1–13)
T4 FREE SERPL-MCNC: 0.79 NG/DL — LOW (ref 0.9–1.8)
TSH SERPL-MCNC: 8.58 UIU/ML — HIGH (ref 0.27–4.2)
WBC # BLD: 4.35 K/UL — SIGNIFICANT CHANGE UP (ref 3.8–10.5)
WBC # FLD AUTO: 4.35 K/UL — SIGNIFICANT CHANGE UP (ref 3.8–10.5)

## 2020-07-16 PROCEDURE — 99222 1ST HOSP IP/OBS MODERATE 55: CPT | Mod: GC

## 2020-07-16 PROCEDURE — 71250 CT THORAX DX C-: CPT | Mod: 26

## 2020-07-16 PROCEDURE — 99232 SBSQ HOSP IP/OBS MODERATE 35: CPT

## 2020-07-16 RX ORDER — LANOLIN ALCOHOL/MO/W.PET/CERES
3 CREAM (GRAM) TOPICAL AT BEDTIME
Refills: 0 | Status: DISCONTINUED | OUTPATIENT
Start: 2020-07-16 | End: 2020-07-17

## 2020-07-16 RX ADMIN — Medication 3 MILLIGRAM(S): at 23:04

## 2020-07-16 RX ADMIN — ENOXAPARIN SODIUM 40 MILLIGRAM(S): 100 INJECTION SUBCUTANEOUS at 11:13

## 2020-07-16 RX ADMIN — FAMOTIDINE 20 MILLIGRAM(S): 10 INJECTION INTRAVENOUS at 11:13

## 2020-07-16 NOTE — PROGRESS NOTE ADULT - PROBLEM SELECTOR PLAN 6
DVT ppx: lovenox -mild edema of the ankles. No calf tenderness or erythema. Low suspicion for DVT  -suspect likely related to mass effect and portal vein compression 2/2 to liver mass

## 2020-07-16 NOTE — DIETITIAN INITIAL EVALUATION ADULT. - PHYSICAL APPEARANCE
underweight/other (specify) Nutrition focused physical exam conducted - found signs of malnutrition [ ]absent [x]present   Subcutaneous fat loss: [MILD]Buccal fat region, [MILD]Triceps region, Muscle wasting: [MODERATE]Temples region, [MODERATE]Clavicle region, [SEVERE]Shoulder region, [SEVERE]thigh region, [MODERATE]Calf region  Skin: No pressure ulcers/DTI noted in flowsheets.  Edema: 1+ right/left ankle/foot

## 2020-07-16 NOTE — DIETITIAN INITIAL EVALUATION ADULT. - RD TO REMAIN AVAILABLE
1. Continue Full Liquids diet. Advance diet as tolerated/medically appropriate. 2. Recommend Ensure Enlive 240mls 3x daily (1050kcal, 60g protein). 3. Continue anti-nausea medication PRN. 4. Encourage PO intake, maintain aspiration precautions.

## 2020-07-16 NOTE — DIETITIAN INITIAL EVALUATION ADULT. - PERTINENT MEDS FT
acetaminophen   Tablet .. PRN  enoxaparin Injectable  famotidine    Tablet  ondansetron Injectable PRN  simethicone PRN

## 2020-07-16 NOTE — PROGRESS NOTE ADULT - PROBLEM SELECTOR PLAN 5
-mild edema of the ankles. No calf tenderness or erythema. Low suspicion for DVT  -suspect likely related to mass effect and portal vein compression 2/2 to liver mass -intermittent, not associated with exertion  -EKG without acute ischemic changes  - trops 24, no change

## 2020-07-16 NOTE — CONSULT NOTE ADULT - ASSESSMENT
Impression:   # Nausea/Vomiting/Decreased PO Intake: Suspicion of gastroenteritis vs liver malignancy seen on CT. Low suspicion of obstruction (reviewed with radiology). Low suspicion for pancreatitis  # Liver Mass: Concerning for HCC  # Elevated liver enzymes: secondary to liver mass with metastatic disease    Recommendation  - Diet as tolerated  - Obtain MRI to further categorize liver lesion  - No need for stent given improved symptoms and no signs of obstruction  - Supportive care per primary team    Nayla Perez MD  Gastroenterology Fellow  [phone]  Please page on call fellow on weekends and after 5pm on weekdays Impression:   # Nausea/Vomiting/Decreased PO Intake: Suspicion of gastroenteritis vs liver malignancy seen on CT. Low suspicion of obstruction (reviewed with radiology). Low suspicion for pancreatitis  # Liver Mass: Concerning for HCC given appearance of imaging and significantly elevated AFP  # Elevated liver enzymes: secondary to liver mass with metastatic disease  # Hep B core Ab positive    Recommendation  - Diet as tolerated  - Obtain MRI to further categorize liver lesion  - No need for stent given improved symptoms and no signs of obstruction on imaging  - Agree with checking Hep B PCR given positive core Ab  - Supportive care per primary team    Nayla Perez MD  Gastroenterology Fellow  [phone]  Please page on call fellow on weekends and after 5pm on weekdays

## 2020-07-16 NOTE — PROGRESS NOTE ADULT - PROBLEM SELECTOR PLAN 4
-intermittent, not associated with exertion  -EKG without acute ischemic changes  - trops 24, no change TSH elevation <10  mild free T4 and T3 reduction, normal T4  Asymptomatic  Follow up with repeat testing will not treat at present

## 2020-07-16 NOTE — CONSULT NOTE ADULT - SUBJECTIVE AND OBJECTIVE BOX
Chief Complaint:  Patient is a 89y old  Female who presents with a chief complaint of inability to tolerate po, abdominal bloating (2020 12:53)    HPI:  89 year old female with hx of TB (s/p treatment) and recently diagnosed liver tumor presents with several fays of decreased PO intake, constipation and nausea. Patient denies dysphagia, odynophagia, fevers, chills, chest pain, shortness of breath    Allergies:  No Known Allergies    Home Medications:  Reviewed    Hospital Medications:  acetaminophen   Tablet .. 650 milliGRAM(s) Oral every 8 hours PRN  enoxaparin Injectable 40 milliGRAM(s) SubCutaneous daily  famotidine    Tablet 20 milliGRAM(s) Oral daily  ondansetron Injectable 4 milliGRAM(s) IV Push every 6 hours PRN  simethicone 80 milliGRAM(s) Chew four times a day PRN    PMHX/PSHX:  Tuberculosis, treated  No pertinent past medical history  No significant past surgical history    Family history:  No pertinent family history in first degree relatives    Social History:       ROS:   General:  No fevers, chills or night sweats.  ENT:  No sore throat or dysphagia  CV:  No pain or palpitations  Resp:  No dyspnea, cough, wheezing  GI:  No pain, No nausea, No vomiting,  No rectal bleeding, No tarry stools  Skin:  No rash or edema      PHYSICAL EXAM:   GENERAL:  NAD, Appears stated age  HEENT:  NC/AT,  conjunctivae clear and pink, sclera -anicteric  CHEST:  CTA B/L, Normal effort  HEART:  RRR S1/S2, No murmurs  ABDOMEN:  Soft, non-tender, non-distended, normoactive bowel sounds,  no masses ,no hepato-splenomegaly, no signs of chronic liver disease  EXTEREMITIES:  No cyanosis or Edema  SKIN:  Warm & Dry. No rash or erythema  NEURO:  Alert, oriented    Vital Signs:  Vital Signs Last 24 Hrs  T(C): 37.2 (2020 14:38), Max: 37.2 (2020 14:38)  T(F): 99 (2020 14:38), Max: 99 (2020 14:38)  HR: 76 (2020 14:38) (65 - 76)  BP: 104/59 (2020 14:38) (104/59 - 123/56)  BP(mean): --  RR: 18 (2020 14:38) (18 - 18)  SpO2: 97% (2020 14:38) (95% - 97%)  Daily     Daily Weight in k.2 (2020 12:30)    LABS:                        11.6   4.35  )-----------( 234      ( 2020 07:30 )             35.0     Mean Cell Volume: 93.1 fL (20 @ 07:30)    -    133<L>  |  101  |  25<H>  ----------------------------<  92  4.2   |  23  |  0.90    Ca    8.2<L>      2020 07:30  Phos  3.3       Mg     2.2         TPro  5.3<L>  /  Alb  2.9<L>  /  TBili  0.6  /  DBili  x   /  AST  79<H>  /  ALT  59<H>  /  AlkPhos  310<H>  07-15    LIVER FUNCTIONS - ( 15 Jul 2020 04:00 )  Alb: 2.9 g/dL / Pro: 5.3 g/dL / ALK PHOS: 310 u/L / ALT: 59 u/L / AST: 79 u/L / GGT: x           PT/INR - ( 2020 07:30 )   PT: 12.9 SEC;   INR: 1.13          PTT - ( 2020 07:30 )  PTT:29.6 SEC                            11.6   4.35  )-----------( 234      ( 2020 07:30 )             35.0                         11.3   4.43  )-----------( 224      ( 15 Jul 2020 04:00 )             35.6                         11.0   4.17  )-----------( 226      ( 2020 11:32 )             34.6     Imaging: Chief Complaint:  Patient is a 89y old  Female who presents with a chief complaint of inability to tolerate po, abdominal bloating (2020 12:53)    HPI:  89 year old female with hx of TB (s/p treatment) and recently diagnosed liver tumor presents with several days of decreased PO intake, constipation and nausea. Patient reports she has been "unable to digest her food" as she feels when she. She reports constipation over the last few days. She went to [facility] to take a medication for it, but states she took '11 tablets' with no improvement. Shortly afterwards she developed these symptoms. Patient denies abdominal pain, dysphagia, odynophagia, fevers, chills, chest pain, shortness of breath, melena, hematochezia, hematemesis, headache and dizziness. She reports since coming to the hospital and she was given a medication (states laxative, but no laxatives were prescribed under orders) with 4 subsequent bowel movements. Reports the initial BM was hard and the size of a 'ping pong' ball. Since then her symptoms have improved and she feels. well.    Allergies:  No Known Allergies    Home Medications:  Reviewed    Hospital Medications:  acetaminophen   Tablet .. 650 milliGRAM(s) Oral every 8 hours PRN  enoxaparin Injectable 40 milliGRAM(s) SubCutaneous daily  famotidine    Tablet 20 milliGRAM(s) Oral daily  ondansetron Injectable 4 milliGRAM(s) IV Push every 6 hours PRN  simethicone 80 milliGRAM(s) Chew four times a day PRN    PMHX/PSHX:  Tuberculosis, treated  No pertinent past medical history  No significant past surgical history    Family history:  No pertinent family history in first degree relatives    Social History:   No history of tobacco use, EtOH use or illicit drug use     ROS:   General:  No fevers, chills or night sweats.  ENT:  No sore throat or dysphagia  CV:  No pain or palpitations  Resp:  No dyspnea, cough, wheezing  GI:  No pain, No nausea, No vomiting,  No rectal bleeding, No tarry stools, + constipation  Skin:  No rash or edema      PHYSICAL EXAM:   GENERAL:  NAD, Appears stated age  HEENT:  NC/AT,  conjunctivae clear and pink,  CHEST:  CTA B/L, Normal effort  HEART:  RRR S1/S2,   ABDOMEN:  Soft, non-tender, non-distended, BS+  EXTEREMITIES:  No cyanosis  SKIN:  Warm & Dry.   NEURO:  Alert, oriented    Vital Signs:  Vital Signs Last 24 Hrs  T(C): 37.2 (2020 14:38), Max: 37.2 (2020 14:38)  T(F): 99 (2020 14:38), Max: 99 (2020 14:38)  HR: 76 (2020 14:38) (65 - 76)  BP: 104/59 (2020 14:38) (104/59 - 123/56)  BP(mean): --  RR: 18 (2020 14:38) (18 - 18)  SpO2: 97% (2020 14:38) (95% - 97%)  Daily     Daily Weight in k.2 (2020 12:30)    LABS:                        11.6   4.35  )-----------( 234      ( 2020 07:30 )             35.0     Mean Cell Volume: 93.1 fL (16-20 @ 07:30)    07-    133<L>  |  101  |  25<H>  ----------------------------<  92  4.2   |  23  |  0.90    Ca    8.2<L>      2020 07:30  Phos  3.3     07-16  Mg     2.2     07-16    TPro  5.3<L>  /  Alb  2.9<L>  /  TBili  0.6  /  DBili  x   /  AST  79<H>  /  ALT  59<H>  /  AlkPhos  310<H>  0715    LIVER FUNCTIONS - ( 15 Jul 2020 04:00 )  Alb: 2.9 g/dL / Pro: 5.3 g/dL / ALK PHOS: 310 u/L / ALT: 59 u/L / AST: 79 u/L / GGT: x           PT/INR - ( 2020 07:30 )   PT: 12.9 SEC;   INR: 1.13        PTT - ( 2020 07:30 )  PTT:29.6 SEC                        11.6   4.35  )-----------( 234      ( 2020 07:30 )             35.0                         11.3   4.43  )-----------( 224      ( 15 Jul 2020 04:00 )             35.6                         11.0   4.17  )-----------( 226      ( 2020 11:32 )             34.6     Imaging: Chief Complaint:  Patient is a 89y old  Female who presents with a chief complaint of inability to tolerate po, abdominal bloating (2020 12:53)    HPI:   ID: Mandarin - [id number]  89 year old female with hx of TB (s/p treatment) and recently diagnosed liver tumor presents with several days of decreased PO intake, constipation and nausea. Patient reports she has been "unable to digest her food". The sight or smell of food makes her nausea and she reports episode of NBNB emesis. She reports constipation over the last few days. She went to [facility] to take a medication for it, but states she took '11 tablets' with no improvement. Shortly afterwards she developed these symptoms. Patient denies abdominal pain, dysphagia, odynophagia, fevers, chills, chest pain, shortness of breath, melena, hematochezia, hematemesis, headache and dizziness. She reports since coming to the hospital and she was given a medication (states laxative, but no laxatives were prescribed under orders) with 4 subsequent bowel movements. Reports the initial BM was hard and the size of a 'ping pong' ball. Since then her symptoms have improved and she feels. well.    Allergies:  No Known Allergies    Home Medications:  Reviewed    Hospital Medications:  acetaminophen   Tablet .. 650 milliGRAM(s) Oral every 8 hours PRN  enoxaparin Injectable 40 milliGRAM(s) SubCutaneous daily  famotidine    Tablet 20 milliGRAM(s) Oral daily  ondansetron Injectable 4 milliGRAM(s) IV Push every 6 hours PRN  simethicone 80 milliGRAM(s) Chew four times a day PRN    PMHX/PSHX:  Tuberculosis, treated  No pertinent past medical history  No significant past surgical history    Family history:  No pertinent family history in first degree relatives    Social History:   No history of tobacco use, EtOH use or illicit drug use     ROS:   General:  No fevers, chills or night sweats.  ENT:  No sore throat or dysphagia  CV:  No pain or palpitations  Resp:  No dyspnea, cough, wheezing  GI:  No pain, No nausea, No vomiting,  No rectal bleeding, No tarry stools, + constipation  Skin:  No rash or edema      PHYSICAL EXAM:   GENERAL:  NAD, Appears stated age  HEENT:  NC/AT,  conjunctivae clear and pink,  CHEST:  CTA B/L, Normal effort  HEART:  RRR S1/S2,   ABDOMEN:  Soft, non-tender, non-distended, BS+  EXTEREMITIES:  No cyanosis  SKIN:  Warm & Dry.   NEURO:  Alert, oriented    Vital Signs:  Vital Signs Last 24 Hrs  T(C): 37.2 (2020 14:38), Max: 37.2 (2020 14:38)  T(F): 99 (2020 14:38), Max: 99 (2020 14:38)  HR: 76 (2020 14:38) (65 - 76)  BP: 104/59 (2020 14:38) (104/59 - 123/56)  BP(mean): --  RR: 18 (2020 14:38) (18 - 18)  SpO2: 97% (2020 14:38) (95% - 97%)  Daily     Daily Weight in k.2 (2020 12:30)    LABS:                        11.6   4.35  )-----------( 234      ( 2020 07:30 )             35.0     Mean Cell Volume: 93.1 fL (20 @ 07:30)    07-    133<L>  |  101  |  25<H>  ----------------------------<  92  4.2   |  23  |  0.90    Ca    8.2<L>      2020 07:30  Phos  3.3     07-16  Mg     2.2     07-16    TPro  5.3<L>  /  Alb  2.9<L>  /  TBili  0.6  /  DBili  x   /  AST  79<H>  /  ALT  59<H>  /  AlkPhos  310<H>  15    LIVER FUNCTIONS - ( 15 Jul 2020 04:00 )  Alb: 2.9 g/dL / Pro: 5.3 g/dL / ALK PHOS: 310 u/L / ALT: 59 u/L / AST: 79 u/L / GGT: x           PT/INR - ( 2020 07:30 )   PT: 12.9 SEC;   INR: 1.13        PTT - ( 2020 07:30 )  PTT:29.6 SEC                        11.6   4.35  )-----------( 234      ( 2020 07:30 )             35.0                         11.3   4.43  )-----------( 224      ( 15 Jul 2020 04:00 )             35.6                         11.0   4.17  )-----------( 226      ( 2020 11:32 )             34.6     Imaging:

## 2020-07-16 NOTE — DIETITIAN INITIAL EVALUATION ADULT. - ENERGY NEEDS
Height (cm): 160  Weight (kg): 48.2 (15 Jul 2020, admit)  BMI (kg/m2): 18.8   IBW: 52.2kg +/-10% *IBW used to calculate protein needs.

## 2020-07-16 NOTE — DIETITIAN INITIAL EVALUATION ADULT. - OTHER INFO
Per chart review patient with medical history of TB (s/p treatment) p/w inability to tolerate PO and abdominal bloating in the setting of large hepatic liver mass. Patient Mandarin speaking, utilized  service to conduct interview,  Juana ID#[id number]. Patient reports inability to tolerate solid foods x2 weeks. Patient notes that after eating she would subsequently vomit. Liquids were better tolerated but still was not able to eat well. Endorses weight loss over the past 6 months. Reports UBW ~70kg -> admit weight 48kg; indicates significant 31% weight loss. Patient reports since admission she is able to tolerate liquids without nausea/vomiting. Notes that she still is only able to have a small portion at a time. States she prefers warm/hot foods and does not like cold foods. Denies any swallowing difficulty on current diet. Patient denies constipation/diarrhea; +BM 7/15. Encouraged PO intake as tolerated. Patient amenable to Ensure Enlive supplementation to optimize PO intake.

## 2020-07-16 NOTE — PROGRESS NOTE ADULT - PROBLEM SELECTOR PLAN 7
1.  Name of PCP:  2.  PCP Contacted on Admission: [ ] Y    [ ] N    3.  PCP contacted at Discharge: [ ] Y    [ ] N    [ ] N/A  4.  Post-Discharge Appointment Date and Location:  5.  Summary of Handoff given to PCP: DVT ppx: lovenox

## 2020-07-16 NOTE — CHART NOTE - NSCHARTNOTEFT_GEN_A_CORE
NUTRITION SERVICES     Upon Nutritional Assessment by the Registered Dietitian your patient was determined to meet criteria/ has evidence of the following diagnosis/diagnoses:    [x] Severe Protein Calorie Malnutrition      [x] Underweight / BMI <19    Findings as based on:  •  Comprehensive nutritional assessment and consultation    Please refer to Initial Dietitian Evaluation or Nutrition Follow-up via documents section of [facility] EMR for further recommendations.

## 2020-07-16 NOTE — DIETITIAN INITIAL EVALUATION ADULT. - PERTINENT LABORATORY DATA
07-16 Na 133 mmol/L<L> Glu 92 mg/dL K+ 4.2 mmol/L Cr 0.90 mg/dL BUN 25 mg/dL<H> Phos 3.3 mg/dL Alb n/a   PAB n/a   Hgb 11.6 g/dL Hct 35.0 % HgA1C n/a    Glucose, Serum: 92 mg/dL

## 2020-07-17 ENCOUNTER — TRANSCRIPTION ENCOUNTER (OUTPATIENT)
Age: 85
End: 2020-07-17

## 2020-07-17 VITALS
SYSTOLIC BLOOD PRESSURE: 110 MMHG | HEART RATE: 67 BPM | DIASTOLIC BLOOD PRESSURE: 60 MMHG | RESPIRATION RATE: 16 BRPM | TEMPERATURE: 99 F | OXYGEN SATURATION: 98 %

## 2020-07-17 LAB
ALBUMIN SERPL ELPH-MCNC: 3 G/DL — LOW (ref 3.3–5)
ALP SERPL-CCNC: 347 U/L — HIGH (ref 40–120)
ALT FLD-CCNC: 94 U/L — HIGH (ref 4–33)
ANION GAP SERPL CALC-SCNC: 10 MMO/L — SIGNIFICANT CHANGE UP (ref 7–14)
AST SERPL-CCNC: 132 U/L — HIGH (ref 4–32)
BASOPHILS # BLD AUTO: 0.02 K/UL — SIGNIFICANT CHANGE UP (ref 0–0.2)
BASOPHILS NFR BLD AUTO: 0.5 % — SIGNIFICANT CHANGE UP (ref 0–2)
BILIRUB SERPL-MCNC: 0.4 MG/DL — SIGNIFICANT CHANGE UP (ref 0.2–1.2)
BUN SERPL-MCNC: 19 MG/DL — SIGNIFICANT CHANGE UP (ref 7–23)
CALCIUM SERPL-MCNC: 8.5 MG/DL — SIGNIFICANT CHANGE UP (ref 8.4–10.5)
CHLORIDE SERPL-SCNC: 101 MMOL/L — SIGNIFICANT CHANGE UP (ref 98–107)
CO2 SERPL-SCNC: 22 MMOL/L — SIGNIFICANT CHANGE UP (ref 22–31)
CREAT SERPL-MCNC: 0.75 MG/DL — SIGNIFICANT CHANGE UP (ref 0.5–1.3)
EOSINOPHIL # BLD AUTO: 0.07 K/UL — SIGNIFICANT CHANGE UP (ref 0–0.5)
EOSINOPHIL NFR BLD AUTO: 1.6 % — SIGNIFICANT CHANGE UP (ref 0–6)
GLUCOSE SERPL-MCNC: 105 MG/DL — HIGH (ref 70–99)
HCT VFR BLD CALC: 35.8 % — SIGNIFICANT CHANGE UP (ref 34.5–45)
HGB BLD-MCNC: 11.5 G/DL — SIGNIFICANT CHANGE UP (ref 11.5–15.5)
IMM GRANULOCYTES NFR BLD AUTO: 0.5 % — SIGNIFICANT CHANGE UP (ref 0–1.5)
LYMPHOCYTES # BLD AUTO: 1.12 K/UL — SIGNIFICANT CHANGE UP (ref 1–3.3)
LYMPHOCYTES # BLD AUTO: 25.8 % — SIGNIFICANT CHANGE UP (ref 13–44)
MAGNESIUM SERPL-MCNC: 2.2 MG/DL — SIGNIFICANT CHANGE UP (ref 1.6–2.6)
MCHC RBC-ENTMCNC: 29.6 PG — SIGNIFICANT CHANGE UP (ref 27–34)
MCHC RBC-ENTMCNC: 32.1 % — SIGNIFICANT CHANGE UP (ref 32–36)
MCV RBC AUTO: 92.3 FL — SIGNIFICANT CHANGE UP (ref 80–100)
MONOCYTES # BLD AUTO: 0.35 K/UL — SIGNIFICANT CHANGE UP (ref 0–0.9)
MONOCYTES NFR BLD AUTO: 8.1 % — SIGNIFICANT CHANGE UP (ref 2–14)
NEUTROPHILS # BLD AUTO: 2.76 K/UL — SIGNIFICANT CHANGE UP (ref 1.8–7.4)
NEUTROPHILS NFR BLD AUTO: 63.5 % — SIGNIFICANT CHANGE UP (ref 43–77)
NRBC # FLD: 0 K/UL — SIGNIFICANT CHANGE UP (ref 0–0)
PLATELET # BLD AUTO: 229 K/UL — SIGNIFICANT CHANGE UP (ref 150–400)
PMV BLD: 9.8 FL — SIGNIFICANT CHANGE UP (ref 7–13)
POTASSIUM SERPL-MCNC: 4.1 MMOL/L — SIGNIFICANT CHANGE UP (ref 3.5–5.3)
POTASSIUM SERPL-SCNC: 4.1 MMOL/L — SIGNIFICANT CHANGE UP (ref 3.5–5.3)
PROT SERPL-MCNC: 5.5 G/DL — LOW (ref 6–8.3)
RBC # BLD: 3.88 M/UL — SIGNIFICANT CHANGE UP (ref 3.8–5.2)
RBC # FLD: 18.3 % — HIGH (ref 10.3–14.5)
SODIUM SERPL-SCNC: 133 MMOL/L — LOW (ref 135–145)
WBC # BLD: 4.34 K/UL — SIGNIFICANT CHANGE UP (ref 3.8–10.5)
WBC # FLD AUTO: 4.34 K/UL — SIGNIFICANT CHANGE UP (ref 3.8–10.5)

## 2020-07-17 PROCEDURE — 99239 HOSP IP/OBS DSCHRG MGMT >30: CPT

## 2020-07-17 RX ORDER — SIMETHICONE 80 MG/1
1 TABLET, CHEWABLE ORAL
Qty: 0 | Refills: 0 | DISCHARGE
Start: 2020-07-17

## 2020-07-17 RX ORDER — ACETAMINOPHEN 500 MG
2 TABLET ORAL
Qty: 0 | Refills: 0 | DISCHARGE
Start: 2020-07-17

## 2020-07-17 RX ORDER — FAMOTIDINE 10 MG/ML
1 INJECTION INTRAVENOUS
Qty: 30 | Refills: 0
Start: 2020-07-17 | End: 2020-08-15

## 2020-07-17 RX ORDER — LANOLIN ALCOHOL/MO/W.PET/CERES
1 CREAM (GRAM) TOPICAL
Qty: 0 | Refills: 0 | DISCHARGE
Start: 2020-07-17

## 2020-07-17 RX ORDER — SIMETHICONE 80 MG/1
1 TABLET, CHEWABLE ORAL
Qty: 28 | Refills: 0
Start: 2020-07-17 | End: 2020-07-23

## 2020-07-17 RX ADMIN — ENOXAPARIN SODIUM 40 MILLIGRAM(S): 100 INJECTION SUBCUTANEOUS at 11:54

## 2020-07-17 RX ADMIN — Medication 650 MILLIGRAM(S): at 01:49

## 2020-07-17 RX ADMIN — FAMOTIDINE 20 MILLIGRAM(S): 10 INJECTION INTRAVENOUS at 11:54

## 2020-07-17 NOTE — PROGRESS NOTE ADULT - PROBLEM SELECTOR PLAN 6
1.  Name of PCP: Dr Thrasher [phone]  2.  PCP Contacted on Admission: [ ] Y    [x ] N    3.  PCP contacted at Discharge: [ x] Y    [ ] N    [ ] N/A  4.  Post-Discharge Appointment Date and Location: [facility]  5.  Summary of Handoff given to PCP: hospital course

## 2020-07-17 NOTE — DISCHARGE NOTE PROVIDER - PROVIDER TOKENS
FREE:[LAST:[[facility]],PHONE:[(   )    -],FAX:[(   )    -]],FREE:[LAST:[[facility]],PHONE:[(   )    -],FAX:[(   )    -]]

## 2020-07-17 NOTE — PROGRESS NOTE ADULT - PROBLEM SELECTOR PLAN 1
-Pt with poor PO intake. Nausea/vomiting with solid food intake. Only able to tolerate liquids  -likely 2/2 to large liver mass with mass effect  -liquid diet pending further eval  -GI consult eval for possible stent
-Pt with poor PO intake. Nausea/vomiting with solid food intake. Only able to tolerate liquids  -likely 2/2 to large liver mass with mass effect  -liquid diet pending further eval  -May require stenting
-Pt with poor PO intake. nausea and vomiting resolved  -likely 2/2 to large liver mass with mass effect  -liquid and soft food diet  -appreciate GI recs, no stent required  -MRI not required for onc eval, will cancel  Stable for d/c home today, discussed with family  d/c time 40 min coordinating care

## 2020-07-17 NOTE — DISCHARGE NOTE PROVIDER - NSDCFUADDAPPT_GEN_ALL_CORE_FT
Please follow up with your primary care provider within 1 week of discharge from the hospital. If you do not have a primary care provider please [facility], call [phone]

## 2020-07-17 NOTE — DISCHARGE NOTE NURSING/CASE MANAGEMENT/SOCIAL WORK - PATIENT PORTAL LINK FT
You can access the FollowMyHealth Patient Portal offered by [facility] by registering at the following website: [email or web address]. By joining [facility]’s FollowMyHealth portal, you will also be able to view your health information using other applications (apps) compatible with our system.

## 2020-07-17 NOTE — CHART NOTE - NSCHARTNOTEFT_GEN_A_CORE
: [id number]    89yo F w/ hx treated pulmonary TB presents with decreased appetite, vomiting, and abdominal bloating. Found to have large hepatic mass concerning for HCC.    Yesterday I provided patient with information in Mandarin regarding sorafinib including side effect profile.   Patient states that after reading it she is concerned about nausea/vomiting and other side effects of treatment that would deteriorate her health.   Has not made a decision about starting treatment. Offered the option of seeing a liver cancer oncologist at [facility] to further discuss options and she was amenable to that decision.    Upon discharge will send referral to [facility] for outpatient oncology follow-up.

## 2020-07-17 NOTE — DISCHARGE NOTE NURSING/CASE MANAGEMENT/SOCIAL WORK - NSDCFUADDAPPT_GEN_ALL_CORE_FT
Please follow up with your primary care provider within 1 week of discharge from the hospital. If you do not have a primary care provider please follow up with the [facility], call [phone]

## 2020-07-17 NOTE — DISCHARGE NOTE PROVIDER - CARE PROVIDER_API CALL
[facility],   Phone: (   )    -  Fax: (   )    -  Follow Up Time:     [facility] Provider,   Phone: (   )    -  Fax: (   )    -  Follow Up Time:

## 2020-07-17 NOTE — PROGRESS NOTE ADULT - PROBLEM SELECTOR PLAN 2
-expansile hypervascular hepatic mass likely HCC as well as multiple pulm nodules thought to be metastatic  -Findings explained to patient and family. Report patient is functional and independent, are interested in discussing treatment options.   -f/u onc recs; likely outpt f/u for treatment  -f/u HepB and C serologies, AFP elevated, HBV Ag negative  -CT chest: pulm nodules c/w mets
-expansile hypervascular hepatic mass likely HCC as well as multiple pulm nodules thought to be metastatic  -Findings explained to patient and family. Report patient is functional and independent, are interested in discussing treatment options.   -f/u onc recs  -f/u HepB and C serologies, AFP  -CT chest for staging
-expansile hypervascular hepatic mass likely HCC as well as multiple pulm nodules thought to be metastatic  -Findings explained to patient and family. Report patient is functional and independent, are interested in discussing treatment options.   -f/u onc recs; outpt f/u for treatment  -AFP elevated, HBV Ag negative, HepB Ab neg core pos, HBV viral load sent, HCV neg  -CT chest: pulm nodules c/w mets

## 2020-07-17 NOTE — PROGRESS NOTE ADULT - PROBLEM SELECTOR PLAN 4
-intermittent, not associated with exertion  -EKG without acute ischemic changes  - trops 24, no change

## 2020-07-17 NOTE — DISCHARGE NOTE PROVIDER - NSDCCPCAREPLAN_GEN_ALL_CORE_FT
PRINCIPAL DISCHARGE DIAGNOSIS  Diagnosis: Failure to thrive in adult  Assessment and Plan of Treatment: You presented to the hosptial with poor oral intake  - likely 2/2 to large liver mass with mass effect  - liquid and soft food diet  - appreciate GI recs, no stent required  - MRI not required for onc eval, will cancel  - Stable for d/c home today, discussed with family  Liver mass  - expansile hypervascular hepatic mass likely HCC as well as multiple pulm nodules thought to be metastatic  - Findings explained to patient and family. Report patient is functional and independent, are interested in discussing treatment options.   - f/u onc recs; outpt f/u for treatment  - AFP elevated, HBV Ag negative, HepB Ab neg core pos, HBV viral load sent, HCV neg  - CT chest: pulm nodules c/w mets.   Subclinical hypothyroidism  - TSH elevation <10  - mild free T4 and T3 reduction, normal T4  - Asymptomatic  - Follow up with repeat testing will not treat at present.   Chest pain  -intermittent, not associated with exertion  -EKG without acute ischemic changes  - trops 24, no change.   Pedal edema  -mild edema of the ankles. No calf tenderness or erythema. Low suspicion for DVT  -suspect likely related to mass effect and portal vein compression 2/2 to liver mass      SECONDARY DISCHARGE DIAGNOSES  Diagnosis: Liver mass  Assessment and Plan of Treatment: PRINCIPAL DISCHARGE DIAGNOSIS  Diagnosis: Failure to thrive in adult  Assessment and Plan of Treatment: You presented to the hosptial with poor oral intake. CT of the abdomen was performed which showed:  There is an expansile hypervascular hepatic mass. No cirrhosis. Findings are suspicious for Hepatocellular cancer. No portal venous thrombosis. Multiple pulmonary nodules concerning for metastatic disease.  - You were seen by the gastroenterology and oncology team. As per gastroenterologist no stent required at this time.   - Please follow up with the gastroenterology as outpatient.  - Please fopllow up with [facility] as outpatient, a referral was made by the oncology team, a representative from [facility] will be contacting you with appointment time and date.  Please follow up with your primary care provider within 1 week of discharge from the hospital. If you do not have a primary care provider please follow up with the [facility], call [phone].      SECONDARY DISCHARGE DIAGNOSES  Diagnosis: Subclinical hypothyroidism  Assessment and Plan of Treatment: Subclinical hypothyroidism  - TSH 8.58, mild free T4 (0.79) and T3 reduction, normal T4  - Asymptomatic  - Follow up with repeat testing with primary care provider ladan outpatient.  Please follow up with your primary care provider within 1 week of discharge from the hospital. If you do not have a primary care provider please follow up with the [facility], call [phone].    Diagnosis: Chest pain, atypical  Assessment and Plan of Treatment: - intermittent, not associated with exertion  - EKG without acute ischemic changes  - troponins negative  Please follow up with your primary care provider within 1 week of discharge from the hospital. If you do not have a primary care provider please follow up with the [facility], call [phone]    Diagnosis: Pedal edema  Assessment and Plan of Treatment: - mild edema of the ankles. No calf tenderness or erythema. Low suspicion for DVT  - suspect likely related to mass effect and portal vein compression secondary to liver mass  - Please follow up with your primary care provider within 1 week of discharge from the hospital. If you do not have a primary care provider please follow up with the [facility], call [phone]

## 2020-07-17 NOTE — PROGRESS NOTE ADULT - SUBJECTIVE AND OBJECTIVE BOX
[facility] Division of Hospital Medicine  Davy Cummins MD  Pager [phone]    Patient is a 89y old  Female who presents with a chief complaint of inability to tolerate po, abdominal bloating (15 Jul 2020 14:14)      SUBJECTIVE / OVERNIGHT EVENTS: Tolerating full liquids      MEDICATIONS  (STANDING):  enoxaparin Injectable 40 milliGRAM(s) SubCutaneous daily  famotidine    Tablet 20 milliGRAM(s) Oral daily    MEDICATIONS  (PRN):  acetaminophen   Tablet .. 650 milliGRAM(s) Oral every 8 hours PRN Mild Pain (1 - 3), Moderate Pain (4 - 6)  ondansetron Injectable 4 milliGRAM(s) IV Push every 6 hours PRN Nausea and/or Vomiting  simethicone 80 milliGRAM(s) Chew four times a day PRN Gas      CAPILLARY BLOOD GLUCOSE        I&O's Summary      PHYSICAL EXAM:  Vital Signs Last 24 Hrs  T(C): 36.4 (16 Jul 2020 06:08), Max: 36.6 (15 Jul 2020 23:01)  T(F): 97.6 (16 Jul 2020 06:08), Max: 97.9 (15 Jul 2020 23:01)  HR: 65 (16 Jul 2020 06:08) (65 - 65)  BP: 123/56 (16 Jul 2020 06:08) (114/54 - 123/56)  BP(mean): --  RR: 18 (16 Jul 2020 06:08) (18 - 18)  SpO2: 95% (16 Jul 2020 06:08) (95% - 97%)  CONSTITUTIONAL: NAD  EYES: EOMI, conjunctiva and sclera clear  ENMT: Moist oral mucosa  NECK: Supple  RESPIRATORY: Breathing unlabored, CTAB  CARDIOVASCULAR: S1S2 no MRG  ABDOMEN: Nontender to palpation, normoactive bowel sounds, no rebound/guarding  MUSCULOSKELETAL: no clubbing or cyanosis of digits  NEUROLOGY: No focal deficits   SKIN: No rashes or lesions    LABS:                        11.6   4.35  )-----------( 234      ( 16 Jul 2020 07:30 )             35.0     07-16    133<L>  |  101  |  25<H>  ----------------------------<  92  4.2   |  23  |  0.90    Ca    8.2<L>      16 Jul 2020 07:30  Phos  3.3     07-16  Mg     2.2     07-16    TPro  5.3<L>  /  Alb  2.9<L>  /  TBili  0.6  /  DBili  x   /  AST  79<H>  /  ALT  59<H>  /  AlkPhos  310<H>  07-15    PT/INR - ( 16 Jul 2020 07:30 )   PT: 12.9 SEC;   INR: 1.13          PTT - ( 16 Jul 2020 07:30 )  PTT:29.6 SEC  CARDIAC MARKERS ( 15 Jul 2020 04:00 )  x     / x     / 38 u/L / 1.18 ng/mL / x          T4, Serum (07.16.20 @ 07:30)    T4, Serum: 6.31 ug/dL  Hepatitis B Surface Antigen (07.16.20 @ 07:30)    Hepatitis B Surface Antigen: NEGATIVE  Free Thyroxine, Serum in AM (07.16.20 @ 07:30)    Free Thyroxine, Serum: 0.79 ng/dL  Thyroid Stimulating Hormone, Serum (07.16.20 @ 07:30)    Thyroid Stimulating Hormone, Serum: 8.58 uIU/mL    Alpha Fetoprotein - Tumor Marker (07.16.20 @ 07:30)    Alpha Fetoprotein - Tumor Marker: 3588.0: Test Repeated  Method: Roche Electrochemiluminescence Immuno Assay  Values obtained with different assay methods or kits  cannot be  used interchangeably.  Results cannot be interpreted as absolute evidence of the  presence  or absence of malignant disease.  AFP values are not interpretable in pregnant females. ng/mL    Culture - Urine (collected 14 Jul 2020 15:25)  Source: .Urine  Final Report (15 Jul 2020 15:19):    <10,000 CFU/mL Normal Urogenital Traci      CODE: FULL
[facility] Division of Hospital Medicine  Davy Cummins MD  Pager [phone]    Patient is a 89y old  Female who presents with a chief complaint of inability to tolerate po, abdominal bloating (15 Jul 2020 11:39)      SUBJECTIVE / OVERNIGHT EVENTS: Tolerated full liquid diet without vomiting      MEDICATIONS  (STANDING):  enoxaparin Injectable 40 milliGRAM(s) SubCutaneous daily  famotidine    Tablet 20 milliGRAM(s) Oral daily    MEDICATIONS  (PRN):  acetaminophen   Tablet .. 650 milliGRAM(s) Oral every 8 hours PRN Mild Pain (1 - 3), Moderate Pain (4 - 6)  ondansetron Injectable 4 milliGRAM(s) IV Push every 6 hours PRN Nausea and/or Vomiting  simethicone 80 milliGRAM(s) Chew four times a day PRN Gas      CAPILLARY BLOOD GLUCOSE        I&O's Summary      PHYSICAL EXAM:  Vital Signs Last 24 Hrs  T(C): 36.5 (15 Jul 2020 12:01), Max: 36.9 (15 Jul 2020 02:13)  T(F): 97.7 (15 Jul 2020 12:01), Max: 98.4 (15 Jul 2020 02:13)  HR: 81 (15 Jul 2020 12:01) (63 - 81)  BP: 111/51 (15 Jul 2020 12:01) (111/51 - 139/55)  BP(mean): --  RR: 18 (15 Jul 2020 12:01) (16 - 20)  SpO2: 98% (15 Jul 2020 12:01) (97% - 98%)  CONSTITUTIONAL: NAD  EYES: EOMI, conjunctiva and sclera clear  ENMT: Moist oral mucosa  NECK: Supple  RESPIRATORY: Breathing unlabored, CTAB  CARDIOVASCULAR: S1S2 no MRG  ABDOMEN: Nontender to palpation, normoactive bowel sounds, no rebound/guarding  MUSCULOSKELETAL: no clubbing or cyanosis of digits  NEUROLOGY: No focal deficits   SKIN: No rashes or lesions    LABS:                        11.3   4.43  )-----------( 224      ( 15 Jul 2020 04:00 )             35.6     07-15    136  |  102  |  21  ----------------------------<  55<L>  3.9   |  19<L>  |  0.70    Ca    8.0<L>      15 Jul 2020 04:00  Phos  3.7     07-15  Mg     2.2     07-15    TPro  5.3<L>  /  Alb  2.9<L>  /  TBili  0.6  /  DBili  x   /  AST  79<H>  /  ALT  59<H>  /  AlkPhos  310<H>  07-15      CARDIAC MARKERS ( 15 Jul 2020 04:00 )  x     / x     / 38 u/L / 1.18 ng/mL / x          Urinalysis Basic - ( 2020 11:03 )    Color: LIGHT YELLOW / Appearance: CLEAR / S.026 / pH: 7.0  Gluc: NEGATIVE / Ketone: TRACE  / Bili: NEGATIVE / Urobili: NORMAL   Blood: NEGATIVE / Protein: NEGATIVE / Nitrite: NEGATIVE   Leuk Esterase: NEGATIVE / RBC: x / WBC x   Sq Epi: x / Non Sq Epi: x / Bacteria: x        CODE: FULL
[facility] Division of Hospital Medicine  Davy Cummins MD  Pager [phone]    Patient is a 89y old  Female who presents with a chief complaint of inability to tolerate po, abdominal bloating (16 Jul 2020 15:50)      SUBJECTIVE / OVERNIGHT EVENTS: tolerating diet      MEDICATIONS  (STANDING):  enoxaparin Injectable 40 milliGRAM(s) SubCutaneous daily  famotidine    Tablet 20 milliGRAM(s) Oral daily  melatonin 3 milliGRAM(s) Oral at bedtime    MEDICATIONS  (PRN):  acetaminophen   Tablet .. 650 milliGRAM(s) Oral every 8 hours PRN Mild Pain (1 - 3), Moderate Pain (4 - 6)  ondansetron Injectable 4 milliGRAM(s) IV Push every 6 hours PRN Nausea and/or Vomiting  simethicone 80 milliGRAM(s) Chew four times a day PRN Gas      CAPILLARY BLOOD GLUCOSE        I&O's Summary      PHYSICAL EXAM:  Vital Signs Last 24 Hrs  T(C): 36.6 (17 Jul 2020 05:01), Max: 37.2 (16 Jul 2020 14:38)  T(F): 97.9 (17 Jul 2020 05:01), Max: 99 (16 Jul 2020 14:38)  HR: 63 (17 Jul 2020 05:01) (63 - 76)  BP: 105/60 (17 Jul 2020 05:01) (104/59 - 124/62)  BP(mean): --  RR: 16 (17 Jul 2020 05:01) (16 - 18)  SpO2: 100% (17 Jul 2020 05:01) (97% - 100%)  CONSTITUTIONAL: NAD  EYES: EOMI, conjunctiva and sclera clear  ENMT: Moist oral mucosa  NECK: Supple  RESPIRATORY: Breathing unlabored, CTAB  CARDIOVASCULAR: S1S2 no MRG  ABDOMEN: Nontender to palpation, normoactive bowel sounds, no rebound/guarding+ hepatomegaly  MUSCULOSKELETAL: no clubbing or cyanosis of digits  NEUROLOGY: No focal deficits   SKIN: No rashes or lesions    LABS:                        11.5   4.34  )-----------( 229      ( 17 Jul 2020 05:26 )             35.8     07-17    133<L>  |  101  |  19  ----------------------------<  105<H>  4.1   |  22  |  0.75    Ca    8.5      17 Jul 2020 05:26  Phos  3.3     07-16  Mg     2.2     07-17    TPro  5.5<L>  /  Alb  3.0<L>  /  TBili  0.4  /  DBili  x   /  AST  132<H>  /  ALT  94<H>  /  AlkPhos  347<H>  07-17    PT/INR - ( 16 Jul 2020 07:30 )   PT: 12.9 SEC;   INR: 1.13          PTT - ( 16 Jul 2020 07:30 )  PTT:29.6 SEC          Culture - Urine (collected 14 Jul 2020 15:25)  Source: .Urine  Final Report (15 Jul 2020 15:19):    <10,000 CFU/mL Normal Urogenital Traci          CODE: FULL

## 2020-07-17 NOTE — PROGRESS NOTE ADULT - PROBLEM SELECTOR PLAN 3
-Pt reports solid food feeling "stuck" or unable to keep down due to bloating  -reviewed CT with radiologist, doubt utility of esophogram as GI sx very likely due to compression of duodenum by tumor  -liquid diet for now  -GI consult for stenting
-Pt reports solid food feeling "stuck" or unable to keep down due to bloating  -reviewed CT with radiologist, doubt utility of esophogram as GI sx very likely due to compression of duodenum by tumor  -liquid diet for now  -consider GI consult for stenting pending onc plan
TSH elevation <10  mild free T4 and T3 reduction, normal T4  Asymptomatic  Follow up with repeat testing will not treat at present

## 2020-07-17 NOTE — PHYSICAL THERAPY INITIAL EVALUATION ADULT - DISCHARGE DISPOSITION, PT EVAL
anticipated discharge to home with no skilled restorative physical therapy services upon discharge from [facility].

## 2020-07-17 NOTE — PROGRESS NOTE ADULT - REASON FOR ADMISSION
inability to tolerate po, abdominal bloating

## 2020-07-17 NOTE — DISCHARGE NOTE PROVIDER - NSDCMRMEDTOKEN_GEN_ALL_CORE_FT
acetaminophen 325 mg oral tablet: 2 tab(s) orally every 8 hours, As needed, Mild Pain (1 - 3), Moderate Pain (4 - 6)  famotidine 20 mg oral tablet: 1 tab(s) orally once a day  melatonin 3 mg oral tablet: 1 tab(s) orally once a day (at bedtime)  simethicone 80 mg oral tablet, chewable: 1 tab(s) orally 4 times a day, As needed, Gas acetaminophen 325 mg oral tablet: 2 tab(s) orally every 8 hours, As needed, Mild Pain (1 - 3), Moderate Pain (4 - 6)  famotidine 20 mg oral tablet: 1 tab(s) orally once a day  melatonin 3 mg oral tablet: 1 tab(s) orally once a day (at bedtime)  simethicone 80 mg oral tablet, chewable: 1 tab(s) orally every 6 hours, As Needed for bloating and/or Gas

## 2020-07-17 NOTE — DISCHARGE NOTE PROVIDER - HOSPITAL COURSE
89 F w/ history of TB (s/p treatment) p/w inability to tolerate PO and abdominal bloating in the setting of large hepatic liver mass         Failure to thrive in adult    - Pt with poor PO intake. nausea and vomiting resolved    - likely 2/2 to large liver mass with mass effect    - liquid and soft food diet    - appreciate GI recs, no stent required    - MRI not required for onc eval, will cancel    - Stable for d/c home today, discussed with family        Liver mass    - expansile hypervascular hepatic mass likely HCC as well as multiple pulm nodules thought to be metastatic    - Findings explained to patient and family. Report patient is functional and independent, are interested in discussing treatment options.     - f/u onc recs; outpt f/u for treatment    - AFP elevated, HBV Ag negative, HepB Ab neg core pos, HBV viral load sent, HCV neg    - CT chest: pulm nodules c/w mets.         Subclinical hypothyroidism    - TSH elevation <10    - mild free T4 and T3 reduction, normal T4    - Asymptomatic    - Follow up with repeat testing will not treat at present.         Chest pain    -intermittent, not associated with exertion    -EKG without acute ischemic changes    - trops 24, no change.         Pedal edema    -mild edema of the ankles. No calf tenderness or erythema. Low suspicion for DVT    -suspect likely related to mass effect and portal vein compression 2/2 to liver mass        Discussed case with Dr. Cummins on 7/17/2020, patient medically stable for discharge to home.

## 2020-07-17 NOTE — PHYSICAL THERAPY INITIAL EVALUATION ADULT - PATIENT/FAMILY/SIGNIFICANT OTHER GOALS STATEMENT, PT EVAL
Pt. non english speaking and deferring use of  phone however, Pt. is alert and able/willing to participate fully in PT services.

## 2020-07-21 LAB
HBV DNA # SERPL NAA+PROBE: NOT DETECTED — SIGNIFICANT CHANGE UP
HBV DNA SERPL NAA+PROBE-LOG#: SIGNIFICANT CHANGE UP

## 2020-08-01 ENCOUNTER — EMERGENCY (EMERGENCY)
Facility: HOSPITAL | Age: 85
LOS: 1 days | Discharge: HOME | End: 2020-08-01
Attending: EMERGENCY MEDICINE | Admitting: EMERGENCY MEDICINE
Payer: MEDICAID

## 2020-08-01 PROCEDURE — 99284 EMERGENCY DEPT VISIT MOD MDM: CPT

## 2020-08-02 VITALS
SYSTOLIC BLOOD PRESSURE: 121 MMHG | RESPIRATION RATE: 15 BRPM | DIASTOLIC BLOOD PRESSURE: 55 MMHG | OXYGEN SATURATION: 99 % | HEART RATE: 64 BPM | TEMPERATURE: 99 F

## 2020-08-02 VITALS
RESPIRATION RATE: 18 BRPM | TEMPERATURE: 98 F | DIASTOLIC BLOOD PRESSURE: 59 MMHG | HEART RATE: 69 BPM | SYSTOLIC BLOOD PRESSURE: 106 MMHG | OXYGEN SATURATION: 98 %

## 2020-08-02 PROBLEM — A15.9 RESPIRATORY TUBERCULOSIS UNSPECIFIED: Chronic | Status: ACTIVE | Noted: 2020-07-14

## 2020-08-02 LAB
ALBUMIN SERPL ELPH-MCNC: 2.9 G/DL — LOW (ref 3.3–5)
ALP SERPL-CCNC: 333 U/L — HIGH (ref 40–120)
ALT FLD-CCNC: 71 U/L — HIGH (ref 4–33)
ANION GAP SERPL CALC-SCNC: 12 MMO/L — SIGNIFICANT CHANGE UP (ref 7–14)
AST SERPL-CCNC: 101 U/L — HIGH (ref 4–32)
BASE EXCESS BLDV CALC-SCNC: -0.1 MMOL/L — SIGNIFICANT CHANGE UP
BASOPHILS # BLD AUTO: 0.01 K/UL — SIGNIFICANT CHANGE UP (ref 0–0.2)
BASOPHILS NFR BLD AUTO: 0.2 % — SIGNIFICANT CHANGE UP (ref 0–2)
BILIRUB SERPL-MCNC: 0.6 MG/DL — SIGNIFICANT CHANGE UP (ref 0.2–1.2)
BLOOD GAS VENOUS - CREATININE: 0.87 MG/DL — SIGNIFICANT CHANGE UP (ref 0.5–1.3)
BUN SERPL-MCNC: 20 MG/DL — SIGNIFICANT CHANGE UP (ref 7–23)
CALCIUM SERPL-MCNC: 8 MG/DL — LOW (ref 8.4–10.5)
CHLORIDE BLDV-SCNC: 112 MMOL/L — HIGH (ref 96–108)
CHLORIDE SERPL-SCNC: 106 MMOL/L — SIGNIFICANT CHANGE UP (ref 98–107)
CO2 SERPL-SCNC: 22 MMOL/L — SIGNIFICANT CHANGE UP (ref 22–31)
CREAT SERPL-MCNC: 0.81 MG/DL — SIGNIFICANT CHANGE UP (ref 0.5–1.3)
EOSINOPHIL # BLD AUTO: 0.03 K/UL — SIGNIFICANT CHANGE UP (ref 0–0.5)
EOSINOPHIL NFR BLD AUTO: 0.7 % — SIGNIFICANT CHANGE UP (ref 0–6)
GAS PNL BLDV: 136 MMOL/L — SIGNIFICANT CHANGE UP (ref 136–146)
GLUCOSE BLDV-MCNC: 86 MG/DL — SIGNIFICANT CHANGE UP (ref 70–99)
GLUCOSE SERPL-MCNC: 94 MG/DL — SIGNIFICANT CHANGE UP (ref 70–99)
HCO3 BLDV-SCNC: 24 MMOL/L — SIGNIFICANT CHANGE UP (ref 20–27)
HCT VFR BLD CALC: 33.4 % — LOW (ref 34.5–45)
HCT VFR BLDV CALC: 33.6 % — LOW (ref 34.5–45)
HGB BLD-MCNC: 10.3 G/DL — LOW (ref 11.5–15.5)
HGB BLDV-MCNC: 10.9 G/DL — LOW (ref 11.5–15.5)
IMM GRANULOCYTES NFR BLD AUTO: 0.5 % — SIGNIFICANT CHANGE UP (ref 0–1.5)
LACTATE BLDV-MCNC: 1.6 MMOL/L — SIGNIFICANT CHANGE UP (ref 0.5–2)
LIDOCAIN IGE QN: 68.5 U/L — HIGH (ref 7–60)
LYMPHOCYTES # BLD AUTO: 0.91 K/UL — LOW (ref 1–3.3)
LYMPHOCYTES # BLD AUTO: 21.7 % — SIGNIFICANT CHANGE UP (ref 13–44)
MCHC RBC-ENTMCNC: 29.9 PG — SIGNIFICANT CHANGE UP (ref 27–34)
MCHC RBC-ENTMCNC: 30.8 % — LOW (ref 32–36)
MCV RBC AUTO: 96.8 FL — SIGNIFICANT CHANGE UP (ref 80–100)
MONOCYTES # BLD AUTO: 0.32 K/UL — SIGNIFICANT CHANGE UP (ref 0–0.9)
MONOCYTES NFR BLD AUTO: 7.6 % — SIGNIFICANT CHANGE UP (ref 2–14)
NEUTROPHILS # BLD AUTO: 2.91 K/UL — SIGNIFICANT CHANGE UP (ref 1.8–7.4)
NEUTROPHILS NFR BLD AUTO: 69.3 % — SIGNIFICANT CHANGE UP (ref 43–77)
NRBC # FLD: 0 K/UL — SIGNIFICANT CHANGE UP (ref 0–0)
PCO2 BLDV: 41 MMHG — SIGNIFICANT CHANGE UP (ref 41–51)
PH BLDV: 7.39 PH — SIGNIFICANT CHANGE UP (ref 7.32–7.43)
PLATELET # BLD AUTO: 189 K/UL — SIGNIFICANT CHANGE UP (ref 150–400)
PMV BLD: 9.9 FL — SIGNIFICANT CHANGE UP (ref 7–13)
PO2 BLDV: 40 MMHG — SIGNIFICANT CHANGE UP (ref 35–40)
POTASSIUM BLDV-SCNC: 3.7 MMOL/L — SIGNIFICANT CHANGE UP (ref 3.4–4.5)
POTASSIUM SERPL-MCNC: 3.9 MMOL/L — SIGNIFICANT CHANGE UP (ref 3.5–5.3)
POTASSIUM SERPL-SCNC: 3.9 MMOL/L — SIGNIFICANT CHANGE UP (ref 3.5–5.3)
PROT SERPL-MCNC: 5.2 G/DL — LOW (ref 6–8.3)
RBC # BLD: 3.45 M/UL — LOW (ref 3.8–5.2)
RBC # FLD: 19.9 % — HIGH (ref 10.3–14.5)
SAO2 % BLDV: 68.1 % — SIGNIFICANT CHANGE UP (ref 60–85)
SODIUM SERPL-SCNC: 140 MMOL/L — SIGNIFICANT CHANGE UP (ref 135–145)
WBC # BLD: 4.2 K/UL — SIGNIFICANT CHANGE UP (ref 3.8–10.5)
WBC # FLD AUTO: 4.2 K/UL — SIGNIFICANT CHANGE UP (ref 3.8–10.5)

## 2020-08-02 PROCEDURE — 74177 CT ABD & PELVIS W/CONTRAST: CPT | Mod: 26

## 2020-08-02 RX ORDER — KETOCONAZOLE 20 MG/G
1 AEROSOL, FOAM TOPICAL
Qty: 30 | Refills: 0
Start: 2020-08-02 | End: 2020-08-15

## 2020-08-02 RX ORDER — SODIUM CHLORIDE 9 MG/ML
1000 INJECTION INTRAMUSCULAR; INTRAVENOUS; SUBCUTANEOUS ONCE
Refills: 0 | Status: COMPLETED | OUTPATIENT
Start: 2020-08-02 | End: 2020-08-02

## 2020-08-02 RX ADMIN — SODIUM CHLORIDE 1000 MILLILITER(S): 9 INJECTION INTRAMUSCULAR; INTRAVENOUS; SUBCUTANEOUS at 01:06

## 2020-08-02 RX ADMIN — Medication 1 APPLICATION(S): at 04:50

## 2020-08-02 RX ADMIN — SODIUM CHLORIDE 1000 MILLILITER(S): 9 INJECTION INTRAMUSCULAR; INTRAVENOUS; SUBCUTANEOUS at 05:00

## 2020-08-02 NOTE — ED PROVIDER NOTE - PHYSICAL EXAMINATION
GENERAL: Awake, alert, NAD, non toxic appearing  HEENT: NC/AT, moist mucous membranes, PERRL, EOMI  LUNGS: CTAB, no wheezes or crackles   CARDIAC: RRR, no m/r/g  ABDOMEN: 2 cm RLQ wound, erythematous, closed, non draining, tender to direct palpation. Soft, normal BS, non tender, non distended, no rebound, no guarding  BACK: No midline spinal tenderness, no CVA tenderness  EXT: 2+ pitting edema bilaterally, no calf tenderness, 2+ DP pulses bilaterally, no deformities.  NEURO: A&Ox3. Moving all extremities.  SKIN: Warm and dry. No rash.  PSYCH: Normal affect.

## 2020-08-02 NOTE — ED PROVIDER NOTE - OBJECTIVE STATEMENT
89 year old female with metastatic liver cancer not on chemotherapy presenting with abdominal pain and an open wound. Patient is Mandarin speaking, history obtained with  [id number]. Patient states one hour prior to arrival she felt a pain in her abdomen and noticed her surgical scar from appendectomy 60 years ago had reopened, was oozing and painful. Denies fevers, chills, vomiting, diarrhea, weakness, CP, SOB.

## 2020-08-02 NOTE — ED PROVIDER NOTE - PATIENT PORTAL LINK FT
You can access the FollowMyHealth Patient Portal offered by [facility] by registering at the following website: [email or web address]. By joining [employer]’s FollowMyHealth portal, you will also be able to view your health information using other applications (apps) compatible with our system.

## 2020-08-02 NOTE — ED PROVIDER NOTE - NSFOLLOWUPINSTRUCTIONS_ED_ALL_ED_FT
You were evaluated in the emergency department for a wound.    Your CT results and lab work is attached.    You should apply the prescribed fungal cream as directed.     Please return to the emergency department if you experience worsening abdominal pain, nausea, vomiting, fevers, chills, or the wound begins draining and becomes more red.

## 2020-08-02 NOTE — ED PROVIDER NOTE - CLINICAL SUMMARY MEDICAL DECISION MAKING FREE TEXT BOX
89 year old female 89 year old female with metastatic liver cancer presenting with reopened surgical wound. Afebrile, hemodynamically stable, non toxic appearing. 2cm erythematous, non draining wound with possible overlying fungal infection. Tender to direct palpation. Suspicion for sepsis low. Send labs, CT abdomen, reassess.

## 2020-08-02 NOTE — ED PROVIDER NOTE - PROGRESS NOTE DETAILS
Jennifer Benitez PGY-1: Spoke with patient's son. Updated him on plan for CT and bloodwork. Confirmed history of metastatic liver cancer with plan for conservative treatment. One hour prior to arrival patient felt pain in her belly from old surgical scar which had opened. Denies fevers, chills, nausea, vomiting at home. Jennifer Benitez PGY-1: CT shows no abscess at wound site. Patient afebrile and no leukocytosis. Will discharge home with fungal cream. Jennifer Benitez PGY-1: Spoke with patient's son and explained results and plan to discharge patient home. Son understands diagnosis and is in agreement with the plan.

## 2020-08-02 NOTE — ED ADULT TRIAGE NOTE - CHIEF COMPLAINT QUOTE
Diallo Minor Speaking as per PI" I had surgery  done 1961 I had appendicitis and now there is pus coming out from the incision area." Hx I have liver ca with mets...no chemo no treatment

## 2020-08-02 NOTE — ED PROVIDER NOTE - ATTENDING CONTRIBUTION TO CARE
Attending note:   After face to face evaluation of this patient, I concur with above noted hx, pe, and care plan for this patient.  Duque: 89 yof with HCC with mets followed as [facility]. Pt noted 1 day of rlq pain with discharge and redness as previous appy surgical site (60 years ago). No fever. On exam, pt is well appearing, no distress, clear lungs, abd soft with RLQ tenderness when pressed. there is a well healed surgical scar with medial excoriation, redness, irritation from tape, wound probed but no dehiscence noted. Pt has 1 day of wound excoriation with possible fungal infection but also tender to deep palpation. Will get labs and CT.

## 2020-08-02 NOTE — ED ADULT NURSE NOTE - OBJECTIVE STATEMENT
Pt presents to room 23, alert&orientedx3, ambulatory with cane at baseline, mandarin-speaking, hx of liver CA (currently not on chemo) coming from home to ED for evaluation of RLQ pain. Pt states she had an appendectomy 60 years ago, c/o healed surgical wound is "oozing with pus". RLQ presents with pain and tender upon palpating, area presents with redness and pus. Pt denies any fevers, n/v/d but endorses taking stool softeners. Pt is non-diaphoretic, breathing even and non-labored, NSR on cardiac monitor, +3 pitting edema observed to b/l LE. 20g IV established on left AC, labs drawn and sent. Awaiting CT. Will continue to monitor

## 2020-08-04 PROBLEM — Z00.00 ENCOUNTER FOR PREVENTIVE HEALTH EXAMINATION: Status: ACTIVE | Noted: 2020-08-04

## 2020-08-07 ENCOUNTER — OUTPATIENT (OUTPATIENT)
Dept: OUTPATIENT SERVICES | Facility: HOSPITAL | Age: 85
LOS: 1 days | Discharge: HOME | End: 2020-08-07

## 2020-08-07 DIAGNOSIS — C22.9 MALIGNANT NEOPLASM OF LIVER, NOT SPECIFIED AS PRIMARY OR SECONDARY: ICD-10-CM

## 2020-08-10 ENCOUNTER — RESULT REVIEW (OUTPATIENT)
Age: 85
End: 2020-08-10

## 2020-08-10 ENCOUNTER — OUTPATIENT (OUTPATIENT)
Dept: OUTPATIENT SERVICES | Facility: HOSPITAL | Age: 85
LOS: 1 days | End: 2020-08-10
Payer: MEDICAID

## 2020-08-10 ENCOUNTER — APPOINTMENT (OUTPATIENT)
Age: 85
End: 2020-08-10
Payer: MEDICAID

## 2020-08-10 ENCOUNTER — APPOINTMENT (OUTPATIENT)
Dept: ULTRASOUND IMAGING | Facility: IMAGING CENTER | Age: 85
End: 2020-08-10
Payer: MEDICAID

## 2020-08-10 VITALS
WEIGHT: 125.66 LBS | HEIGHT: 60 IN | DIASTOLIC BLOOD PRESSURE: 71 MMHG | RESPIRATION RATE: 18 BRPM | SYSTOLIC BLOOD PRESSURE: 107 MMHG | TEMPERATURE: 98 F | OXYGEN SATURATION: 99 % | BODY MASS INDEX: 24.67 KG/M2 | HEART RATE: 81 BPM

## 2020-08-10 DIAGNOSIS — C22.0 LIVER CELL CARCINOMA: ICD-10-CM

## 2020-08-10 DIAGNOSIS — Z86.19 PERSONAL HISTORY OF OTHER INFECTIOUS AND PARASITIC DISEASES: ICD-10-CM

## 2020-08-10 DIAGNOSIS — R10.9 UNSPECIFIED ABDOMINAL PAIN: ICD-10-CM

## 2020-08-10 DIAGNOSIS — R60.0 LOCALIZED EDEMA: ICD-10-CM

## 2020-08-10 DIAGNOSIS — K59.00 CONSTIPATION, UNSPECIFIED: ICD-10-CM

## 2020-08-10 DIAGNOSIS — Z63.4 DISAPPEARANCE AND DEATH OF FAMILY MEMBER: ICD-10-CM

## 2020-08-10 DIAGNOSIS — R76.8 OTHER SPECIFIED ABNORMAL IMMUNOLOGICAL FINDINGS IN SERUM: ICD-10-CM

## 2020-08-10 DIAGNOSIS — I82.409 ACUTE EMBOLISM AND THROMBOSIS OF UNSPECIFIED DEEP VEINS OF UNSPECIFIED LOWER EXTREMITY: ICD-10-CM

## 2020-08-10 LAB
AFP-TM SERPL-MCNC: 4431 NG/ML
ALBUMIN SERPL ELPH-MCNC: 2.9 G/DL
ALP BLD-CCNC: 334 U/L
ALT SERPL-CCNC: 49 U/L
ANION GAP SERPL CALC-SCNC: 12 MMOL/L
APTT BLD: 28.6 SEC
AST SERPL-CCNC: 71 U/L
BASOPHILS # BLD AUTO: 0.01 K/UL — SIGNIFICANT CHANGE UP (ref 0–0.2)
BASOPHILS NFR BLD AUTO: 0.2 % — SIGNIFICANT CHANGE UP (ref 0–2)
BILIRUB SERPL-MCNC: 0.8 MG/DL
BUN SERPL-MCNC: 15 MG/DL
CALCIUM SERPL-MCNC: 8.4 MG/DL
CHLORIDE SERPL-SCNC: 103 MMOL/L
CO2 SERPL-SCNC: 22 MMOL/L
CREAT SERPL-MCNC: 0.78 MG/DL
EOSINOPHIL # BLD AUTO: 0.06 K/UL — SIGNIFICANT CHANGE UP (ref 0–0.5)
EOSINOPHIL NFR BLD AUTO: 1.3 % — SIGNIFICANT CHANGE UP (ref 0–6)
GLUCOSE SERPL-MCNC: 84 MG/DL
HCT VFR BLD CALC: 34.6 % — SIGNIFICANT CHANGE UP (ref 34.5–45)
HGB BLD-MCNC: 11.3 G/DL — LOW (ref 11.5–15.5)
IMM GRANULOCYTES NFR BLD AUTO: 0.6 % — SIGNIFICANT CHANGE UP (ref 0–1.5)
INR PPP: 1.08 RATIO
LYMPHOCYTES # BLD AUTO: 0.92 K/UL — LOW (ref 1–3.3)
LYMPHOCYTES # BLD AUTO: 19.3 % — SIGNIFICANT CHANGE UP (ref 13–44)
MCHC RBC-ENTMCNC: 31 PG — SIGNIFICANT CHANGE UP (ref 27–34)
MCHC RBC-ENTMCNC: 32.7 GM/DL — SIGNIFICANT CHANGE UP (ref 32–36)
MCV RBC AUTO: 94.8 FL — SIGNIFICANT CHANGE UP (ref 80–100)
MONOCYTES # BLD AUTO: 0.31 K/UL — SIGNIFICANT CHANGE UP (ref 0–0.9)
MONOCYTES NFR BLD AUTO: 6.5 % — SIGNIFICANT CHANGE UP (ref 2–14)
NEUTROPHILS # BLD AUTO: 3.44 K/UL — SIGNIFICANT CHANGE UP (ref 1.8–7.4)
NEUTROPHILS NFR BLD AUTO: 72.1 % — SIGNIFICANT CHANGE UP (ref 43–77)
NRBC # BLD: 0 /100 WBCS — SIGNIFICANT CHANGE UP (ref 0–0)
PLATELET # BLD AUTO: 208 K/UL — SIGNIFICANT CHANGE UP (ref 150–400)
POTASSIUM SERPL-SCNC: 4.8 MMOL/L
PROT SERPL-MCNC: 5.5 G/DL
PT BLD: 12.8 SEC
RBC # BLD: 3.65 M/UL — LOW (ref 3.8–5.2)
RBC # FLD: 19.9 % — HIGH (ref 10.3–14.5)
SODIUM SERPL-SCNC: 137 MMOL/L
WBC # BLD: 4.77 K/UL — SIGNIFICANT CHANGE UP (ref 3.8–10.5)
WBC # FLD AUTO: 4.77 K/UL — SIGNIFICANT CHANGE UP (ref 3.8–10.5)

## 2020-08-10 PROCEDURE — 99354: CPT

## 2020-08-10 PROCEDURE — 99205 OFFICE O/P NEW HI 60 MIN: CPT

## 2020-08-10 PROCEDURE — 93970 EXTREMITY STUDY: CPT | Mod: 26

## 2020-08-10 PROCEDURE — 93970 EXTREMITY STUDY: CPT

## 2020-08-10 RX ORDER — OXYCODONE 5 MG/1
5 TABLET ORAL
Qty: 90 | Refills: 0 | Status: ACTIVE | COMMUNITY
Start: 2020-08-10 | End: 1900-01-01

## 2020-08-10 RX ORDER — APIXABAN 5 MG (74)
5 KIT ORAL
Qty: 1 | Refills: 0 | Status: ACTIVE | COMMUNITY
Start: 2020-08-10 | End: 1900-01-01

## 2020-08-10 RX ORDER — FUROSEMIDE 80 MG/1
TABLET ORAL
Refills: 0 | Status: DISCONTINUED | COMMUNITY
End: 2020-08-10

## 2020-08-10 RX ORDER — LACTULOSE 10 G/15ML
10 SOLUTION ORAL DAILY
Qty: 1 | Refills: 3 | Status: ACTIVE | COMMUNITY
Start: 2020-08-10 | End: 1900-01-01

## 2020-08-10 SDOH — SOCIAL STABILITY - SOCIAL INSECURITY: DISSAPEARANCE AND DEATH OF FAMILY MEMBER: Z63.4

## 2020-08-29 PROBLEM — R76.8 HEPATITIS B CORE ANTIBODY POSITIVE: Status: RESOLVED | Noted: 2020-08-29 | Resolved: 2020-08-29

## 2020-08-29 PROBLEM — K59.00 CONSTIPATION: Status: ACTIVE | Noted: 2020-08-10

## 2020-08-29 PROBLEM — R60.0 EDEMA OF BOTH LOWER EXTREMITIES: Status: ACTIVE | Noted: 2020-08-10

## 2020-08-29 PROBLEM — Z86.19 HISTORY OF MYCOBACTERIAL INFECTION: Status: RESOLVED | Noted: 2020-08-29 | Resolved: 2020-08-29

## 2020-08-29 PROBLEM — R10.9 ABDOMINAL PAIN: Status: ACTIVE | Noted: 2020-08-10

## 2020-08-29 PROBLEM — Z63.4 WIDOWED: Status: ACTIVE | Noted: 2020-08-29

## 2020-08-29 PROBLEM — C22.0 HCC (HEPATOCELLULAR CARCINOMA): Status: ACTIVE | Noted: 2020-08-10

## 2020-08-29 PROBLEM — I82.409 DVT (DEEP VENOUS THROMBOSIS): Status: ACTIVE | Noted: 2020-08-10

## 2020-08-29 RX ORDER — OXYCODONE 5 MG/1
5 TABLET ORAL
Refills: 0 | Status: DISCONTINUED | COMMUNITY
Start: 2020-08-10 | End: 2020-08-29

## 2020-08-29 NOTE — REASON FOR VISIT
[Initial Consultation] : an initial consultation [Family Member] : family member [FreeTextEntry2] : HCC

## 2020-08-29 NOTE — PHYSICAL EXAM
[Capable of only limited self care, confined to bed or chair more than 50% of waking hours] : Status 3- Capable of only limited self care, confined to bed or chair more than 50% of waking hours [Normal] : affect appropriate [de-identified] : + LE edema 3-4+ [de-identified] : chronically ill appearing  [de-identified] : TTP in RUQ, normal BS

## 2020-08-29 NOTE — ASSESSMENT
[Supportive] : Goals of care discussed with patient: Supportive [Palliative Care Plan] : Patient was apprised of incurable nature of disease.  Hospice and Palliative care options discussed.

## 2020-08-29 NOTE — HISTORY OF PRESENT ILLNESS
[Disease: _____________________] : Disease: [unfilled] [AJCC Stage: ____] : AJCC Stage: [unfilled] [de-identified] : n/a [de-identified] : 89 F h/o treated TB, presents for further management of advanced HCC. \par \par Khai was admitted to [facility] 7/14-7/17/20 with abdominal bloating and early satiety. Symptoms present for months, but worsened over two weeks prior to hospitalization. a/w vomiting, LE edema, significant weight loss. CT C/A/P demonstrated a 17 cm hypervascular mass and multiple pulmonary  nodules suspicious for metastatic disease. Labs suggested prior Hepatitis B infection or exposure. LFTs elevated, AFP 3588, alb 2.8.  Pt previously independent and active therefore referred to medical oncology for further discussion of treatments. \par \par After hospital discharge, pt went to [facility] for a consultation. Was recommended hospice and this was set up. Pt was also started on Lasix. However, pt's daughter in [place], who is a physician there, sent Sorafenib (unknown dose) for pt to start taking. Per daughter pt has been taking Sorafenib (reportedly quarter of total dose) once per day. \par \par Presents to [facility] for second opinion regarding further management.  [de-identified] : Prior to diagnosis, was active, lives with her son who was primary caretaker, now daughter from [place] is here. \par Pt clinically has declined in the past 2 weeks, now remaining mostly bedbound requiring assistance with most ADLs. \par Currently pain worse with lying down takes oxy IR 5 mg 1-2 at night with minimal relief \par + N/v\par eating very little, ongoing weight loss\par LE edema b/l has significantly worsened, cannot move legs \par + constipation \par  [de-identified] : AFP 3584

## 2020-08-29 NOTE — REVIEW OF SYSTEMS
[Fatigue] : fatigue [Abdominal Pain] : abdominal pain [Constipation] : constipation [Muscle Weakness] : muscle weakness [Loss of Hearing] : loss of hearing [Lower Ext Edema] : lower extremity edema [Joint Stiffness] : joint stiffness [Joint Pain] : joint pain [Difficulty Walking] : difficulty walking [Anxiety] : anxiety [Depression] : depression [FreeTextEntry2] : wt loss 24 lb [Diarrhea] : no diarrhea [FreeTextEntry4] : + dry mouth  [FreeTextEntry7] : pain stomach chest back worse with lying down  cramping, dyspepsia

## 2021-11-23 NOTE — H&P ADULT - PROBLEM SELECTOR PLAN 2
Addended by: JELANI CHACON on: 11/23/2021 11:38 AM     Modules accepted: Orders     -expansile hypervascular hepatic mass likely HCC as well as multiple pulm nodules thought to be metastatic  -Findings explained to patient and family. Report patient is functional and independent, are interested in discussing treatment options. Oncology email consulted